# Patient Record
Sex: MALE | Race: WHITE
[De-identification: names, ages, dates, MRNs, and addresses within clinical notes are randomized per-mention and may not be internally consistent; named-entity substitution may affect disease eponyms.]

---

## 2017-06-13 NOTE — NUR
Patient discharged to home via AMBULANCE in stable conditon.  Written and 
verbal after care instructions given. Patient verbalizes understanding of 
instructions.  Report given to EMT.

## 2017-06-13 NOTE — NUR
CALLED [facility] AND SPOKE TO PAVEL BONILLA PT GOING BACK TO FACILITY. 
CALLED EMR FOR TX, ETA IS 30 MIN. PT IS RESTING IN BED, NO C/O PAIN.

## 2017-12-03 ENCOUNTER — HOSPITAL ENCOUNTER (INPATIENT)
Dept: HOSPITAL 12 - ER | Age: 82
LOS: 3 days | Discharge: NURSING, REHAB OR LONG-TERM CARE | DRG: 689 | End: 2017-12-06
Payer: MEDICARE

## 2017-12-03 VITALS — BODY MASS INDEX: 20.99 KG/M2 | WEIGHT: 126 LBS | HEIGHT: 65 IN

## 2017-12-03 DIAGNOSIS — D50.9: ICD-10-CM

## 2017-12-03 DIAGNOSIS — Z79.899: ICD-10-CM

## 2017-12-03 DIAGNOSIS — Z16.12: ICD-10-CM

## 2017-12-03 DIAGNOSIS — R53.1: ICD-10-CM

## 2017-12-03 DIAGNOSIS — K21.9: ICD-10-CM

## 2017-12-03 DIAGNOSIS — N39.0: Primary | ICD-10-CM

## 2017-12-03 DIAGNOSIS — F03.90: ICD-10-CM

## 2017-12-03 DIAGNOSIS — M19.90: ICD-10-CM

## 2017-12-03 DIAGNOSIS — K92.2: ICD-10-CM

## 2017-12-03 DIAGNOSIS — K59.09: ICD-10-CM

## 2017-12-03 DIAGNOSIS — I10: ICD-10-CM

## 2017-12-03 DIAGNOSIS — F41.9: ICD-10-CM

## 2017-12-03 DIAGNOSIS — G92: ICD-10-CM

## 2017-12-03 DIAGNOSIS — F32.9: ICD-10-CM

## 2017-12-03 LAB
ALP SERPL-CCNC: 138 U/L (ref 50–136)
ALT SERPL W/O P-5'-P-CCNC: 18 U/L (ref 16–63)
APPEARANCE UR: (no result)
AST SERPL-CCNC: 20 U/L (ref 15–37)
BASOPHILS # BLD AUTO: 0 K/UL (ref 0–8)
BASOPHILS NFR BLD AUTO: 0.4 % (ref 0–2)
BILIRUB DIRECT SERPL-MCNC: 0.1 MG/DL (ref 0–0.2)
BILIRUB SERPL-MCNC: 0.2 MG/DL (ref 0.2–1)
BILIRUB UR QL STRIP: NEGATIVE
BUN SERPL-MCNC: 19 MG/DL (ref 7–18)
CHLORIDE SERPL-SCNC: 100 MMOL/L (ref 98–107)
CO2 SERPL-SCNC: 29 MMOL/L (ref 21–32)
COLOR UR: YELLOW
CREAT SERPL-MCNC: 1.2 MG/DL (ref 0.6–1.3)
EOSINOPHIL # BLD AUTO: 0.2 K/UL (ref 0–0.7)
EOSINOPHIL NFR BLD AUTO: 2.1 % (ref 0–7)
GLUCOSE SERPL-MCNC: 99 MG/DL (ref 74–106)
GLUCOSE UR STRIP-MCNC: NEGATIVE MG/DL
HCT VFR BLD AUTO: 32.9 % (ref 40–50)
HGB BLD-MCNC: 10.6 G/DL (ref 14–18)
HGB UR QL STRIP: (no result)
KETONES UR STRIP-MCNC: NEGATIVE MG/DL
LEUKOCYTE ESTERASE UR QL STRIP: (no result)
LYMPHOCYTES # BLD AUTO: 2.8 K/UL (ref 0.8–4.8)
LYMPHOCYTES NFR BLD AUTO: 24.6 % (ref 20.5–51.5)
MCH RBC QN AUTO: 25.1 UUG (ref 27–31)
MCHC RBC AUTO-ENTMCNC: 32 G/DL (ref 32–37)
MCV RBC AUTO: 78.2 FL (ref 82–92)
MONOCYTES # BLD AUTO: 1.3 K/UL (ref 0.1–1.3)
MONOCYTES NFR BLD AUTO: 11.5 % (ref 0–11)
NEUTROPHILS # BLD AUTO: 7.1 K/UL (ref 1.8–8.9)
NEUTROPHILS NFR BLD AUTO: 61.4 % (ref 38.5–71.5)
NITRITE UR QL STRIP: POSITIVE
PH UR STRIP: 7 [PH] (ref 5–8)
PLATELET # BLD AUTO: 420 K/UL (ref 150–450)
POTASSIUM SERPL-SCNC: 3.9 MMOL/L (ref 3.5–5.1)
PROT UR QL STRIP: NEGATIVE
RBC # BLD AUTO: 4.2 MIL/UL (ref 4.7–6.1)
RBC #/AREA URNS HPF: (no result) /HPF (ref 0–3)
SP GR UR STRIP: 1.01 (ref 1–1.03)
UROBILINOGEN UR STRIP-MCNC: 0.2 E.U./DL
WBC # BLD AUTO: 11.4 K/UL (ref 4–11.2)
WBC #/AREA URNS HPF: (no result) /HPF
WBC #/AREA URNS HPF: (no result) /HPF (ref 0–3)
WS STN SPEC: 6.9 G/DL (ref 6.4–8.2)

## 2017-12-03 PROCEDURE — A4663 DIALYSIS BLOOD PRESSURE CUFF: HCPCS

## 2017-12-03 NOTE — NUR
Patient provided urine specimen, patient was soiled at that time.  During 
cleaning observed patient had rash throughout perineum (front and back) which 
was covered in cream.  Patient additionally had tissue stuck in the daiper in 
the no-anal area.  Patient cleaned and dressed in hoispital gown at this 
time.

## 2017-12-03 NOTE — NUR
Patient BIB private ambulance from [facility] for abnormal labs.  Per 
report, patient has ESBL in urine, facility paperwork indicates as well.  
Patient states no pain or complaint at this time except he has "anal pain."  To 
room 5A.

## 2017-12-03 NOTE — NUR
Pt arrived alert awake oriented to self and . in telemetry sinus rhythm. Pt noted BP 
172/69. No s/s of acute distress. Pt noted with redness to sacral and groin area. Able to 
follow simple commands. Awaiting MD orders. Continuing or Merrem IV antibiotics from ER. 
Denies pain or discomfort at this time. 2 side rails raised and call light placed within 
reach.

## 2017-12-04 VITALS — SYSTOLIC BLOOD PRESSURE: 145 MMHG | DIASTOLIC BLOOD PRESSURE: 79 MMHG

## 2017-12-04 VITALS — SYSTOLIC BLOOD PRESSURE: 172 MMHG | DIASTOLIC BLOOD PRESSURE: 96 MMHG

## 2017-12-04 VITALS — DIASTOLIC BLOOD PRESSURE: 73 MMHG | SYSTOLIC BLOOD PRESSURE: 117 MMHG

## 2017-12-04 VITALS — DIASTOLIC BLOOD PRESSURE: 85 MMHG | SYSTOLIC BLOOD PRESSURE: 146 MMHG

## 2017-12-04 LAB
BASOPHILS # BLD AUTO: 0 K/UL (ref 0–8)
BASOPHILS NFR BLD AUTO: 0.4 % (ref 0–2)
BUN SERPL-MCNC: 15 MG/DL (ref 7–18)
CHLORIDE SERPL-SCNC: 102 MMOL/L (ref 98–107)
CO2 SERPL-SCNC: 27 MMOL/L (ref 21–32)
CREAT SERPL-MCNC: 0.9 MG/DL (ref 0.6–1.3)
EOSINOPHIL # BLD AUTO: 0.2 K/UL (ref 0–0.7)
EOSINOPHIL NFR BLD AUTO: 2.4 % (ref 0–7)
GLUCOSE SERPL-MCNC: 82 MG/DL (ref 74–106)
HCT VFR BLD AUTO: 31.6 % (ref 36.7–47.1)
HGB BLD-MCNC: 10.1 G/DL (ref 12.5–16.3)
LYMPHOCYTES # BLD AUTO: 3.2 K/UL (ref 20–40)
LYMPHOCYTES NFR BLD AUTO: 30.3 % (ref 20.5–51.5)
MAGNESIUM SERPL-MCNC: 2 MG/DL (ref 1.8–2.4)
MCH RBC QN AUTO: 24.7 UUG (ref 23.8–33.4)
MCHC RBC AUTO-ENTMCNC: 32 G/DL (ref 32.5–36.3)
MCV RBC AUTO: 77.6 FL (ref 73–96.2)
MONOCYTES # BLD AUTO: 1.4 K/UL (ref 2–10)
MONOCYTES NFR BLD AUTO: 13.1 % (ref 0–11)
NEUTROPHILS # BLD AUTO: 5.6 K/UL (ref 1.8–8.9)
NEUTROPHILS NFR BLD AUTO: 53.8 % (ref 38.5–71.5)
PHOSPHATE SERPL-MCNC: 3.7 MG/DL (ref 2.5–4.9)
PLATELET # BLD AUTO: 349 K/UL (ref 152–348)
POTASSIUM SERPL-SCNC: 3.7 MMOL/L (ref 3.5–5.1)
RBC # BLD AUTO: 4.08 MIL/UL (ref 4.06–5.63)
WBC # BLD AUTO: 10.5 K/UL (ref 3.6–10.2)

## 2017-12-04 RX ADMIN — MEMANTINE HYDROCHLORIDE SCH MG: 10 TABLET ORAL at 21:41

## 2017-12-04 RX ADMIN — Medication SCH MG: at 08:10

## 2017-12-04 RX ADMIN — CARVEDILOL SCH MG: 6.25 TABLET, FILM COATED ORAL at 17:05

## 2017-12-04 RX ADMIN — DONEPEZIL HYDROCHLORIDE SCH MG: 10 TABLET, FILM COATED ORAL at 21:41

## 2017-12-04 RX ADMIN — SODIUM CHLORIDE PRN MLS/HR: 0.9 INJECTION, SOLUTION INTRAVENOUS at 03:50

## 2017-12-04 RX ADMIN — SODIUM CHLORIDE SCH MLS/HR: 9 INJECTION, SOLUTION INTRAVENOUS at 21:42

## 2017-12-04 RX ADMIN — Medication SCH ML: at 17:25

## 2017-12-04 RX ADMIN — FERROUS SULFATE TAB 325 MG (65 MG ELEMENTAL FE) SCH MG: 325 (65 FE) TAB at 17:03

## 2017-12-04 RX ADMIN — SODIUM CHLORIDE SCH MLS/HR: 9 INJECTION, SOLUTION INTRAVENOUS at 14:37

## 2017-12-04 RX ADMIN — Medication SCH ML: at 08:00

## 2017-12-04 RX ADMIN — FAMOTIDINE SCH MG: 20 TABLET, FILM COATED ORAL at 08:10

## 2017-12-04 RX ADMIN — CLOTRIMAZOLE AND BETAMETHASONE DIPROPIONATE SCH APPLIC: 10; .5 CREAM TOPICAL at 21:42

## 2017-12-04 RX ADMIN — CLOTRIMAZOLE AND BETAMETHASONE DIPROPIONATE SCH APPLIC: 10; .5 CREAM TOPICAL at 14:38

## 2017-12-04 RX ADMIN — CARVEDILOL SCH MG: 6.25 TABLET, FILM COATED ORAL at 08:11

## 2017-12-04 NOTE — NUR
PT CONFUSED, AOX1, AMBULATORY, ON FALL PRECAUTIONS, BED AT LOWEST LOCKED POSITION, BED ALARM 
SET. PT HAS HAD SEVERAL BM ON THE FLOOR, ONE OF THE BM HAD BLOOD IN STOOL, DOCTOR NOTIFIED 
AND STOOL FOR OB SENT, RESULTS PENDING. PT IS REORIENTED CONSTANTLY. WOUND CARE NURSE CAME 
AND EVALUATED THE PT, WOUND CARE DONE AS ORDERED.

## 2017-12-04 NOTE — NUR
WOUND CARE CONSULT: PT AMBULATES TO BATHROOM PER NURSING STAFF BUT INCONTINENT AT TIMES. 
VERY RED RASH NOTED TO PERINEUM, GROIN AREAS AND BUTTOCKS, PRESENT ON ADMISSION. 
RECOMMENDATIONS MADE FOR CARE OF RASH AND SKIN PROTECTION. DISCUSSED WITH NURSING STAFF. 
WILL SEE PRN. MD IN AGREEMENT WITH PLAN OF CARE. 

-------------------------------------------------------------------------------

Addendum: 12/04/17 at 1342 by PRASHANT GRANT RN

-------------------------------------------------------------------------------

Amended: Links added.

## 2017-12-05 VITALS — SYSTOLIC BLOOD PRESSURE: 115 MMHG | DIASTOLIC BLOOD PRESSURE: 70 MMHG

## 2017-12-05 VITALS — SYSTOLIC BLOOD PRESSURE: 131 MMHG | DIASTOLIC BLOOD PRESSURE: 77 MMHG

## 2017-12-05 VITALS — SYSTOLIC BLOOD PRESSURE: 154 MMHG | DIASTOLIC BLOOD PRESSURE: 91 MMHG

## 2017-12-05 LAB — HEMOCCULT STL QL: POSITIVE

## 2017-12-05 RX ADMIN — SODIUM CHLORIDE SCH MLS/HR: 9 INJECTION, SOLUTION INTRAVENOUS at 21:00

## 2017-12-05 RX ADMIN — CLOTRIMAZOLE AND BETAMETHASONE DIPROPIONATE SCH APPLIC: 10; .5 CREAM TOPICAL at 20:26

## 2017-12-05 RX ADMIN — SODIUM CHLORIDE PRN MLS/HR: 0.9 INJECTION, SOLUTION INTRAVENOUS at 02:05

## 2017-12-05 RX ADMIN — SODIUM CHLORIDE SCH MLS/HR: 9 INJECTION, SOLUTION INTRAVENOUS at 05:26

## 2017-12-05 RX ADMIN — MEMANTINE HYDROCHLORIDE SCH MG: 10 TABLET ORAL at 20:25

## 2017-12-05 RX ADMIN — FERROUS SULFATE TAB 325 MG (65 MG ELEMENTAL FE) SCH MG: 325 (65 FE) TAB at 16:03

## 2017-12-05 RX ADMIN — CLOTRIMAZOLE AND BETAMETHASONE DIPROPIONATE SCH APPLIC: 10; .5 CREAM TOPICAL at 08:42

## 2017-12-05 RX ADMIN — Medication SCH ML: at 08:43

## 2017-12-05 RX ADMIN — SODIUM CHLORIDE SCH MLS/HR: 9 INJECTION, SOLUTION INTRAVENOUS at 13:14

## 2017-12-05 RX ADMIN — Medication SCH ML: at 16:03

## 2017-12-05 RX ADMIN — DONEPEZIL HYDROCHLORIDE SCH MG: 10 TABLET, FILM COATED ORAL at 20:25

## 2017-12-05 RX ADMIN — Medication SCH MG: at 08:37

## 2017-12-05 RX ADMIN — FERROUS SULFATE TAB 325 MG (65 MG ELEMENTAL FE) SCH MG: 325 (65 FE) TAB at 08:37

## 2017-12-05 RX ADMIN — CARVEDILOL SCH MG: 6.25 TABLET, FILM COATED ORAL at 08:42

## 2017-12-05 RX ADMIN — FAMOTIDINE SCH MG: 20 TABLET, FILM COATED ORAL at 08:37

## 2017-12-05 RX ADMIN — CARVEDILOL SCH MG: 6.25 TABLET, FILM COATED ORAL at 16:03

## 2017-12-05 NOTE — NUR
Pt in room asleep in no acute distress. No reaction to current Merrem IV abx. No active 
loose BM at this time. Continue to monitor. Bed alarm on.

## 2017-12-05 NOTE — NUR
Applied lotrisone cream as ordered by wound care nurse.  Groin and buttocks were both bright 
red.   Instructed  CNA to keep skin dry at all times.  Established hourly rounding on pt.  
PT is in no acute distress.  PT forgetful at times.  Call light is within reach.

## 2017-12-05 NOTE — NUR
Fall precaution and isolation precaution for esbl urine implemented.  Bed alarm on pt near 
nursing station.   IV on left wrist #22 intact.  Call light is within reach.

## 2017-12-05 NOTE — NUR
Pt is in no acute distress.  PT cooperative with treatments and taking his medications.  PT 
denies any c/o pain.  Call light is within reach.

## 2017-12-05 NOTE — NUR
Pt in alert awake in room needing constant reorientation. No s/s of acute distress. Bed 
alarm on with 3 side rails up. Denies any pain or discomfort at this time.

## 2017-12-06 VITALS — DIASTOLIC BLOOD PRESSURE: 68 MMHG | SYSTOLIC BLOOD PRESSURE: 128 MMHG

## 2017-12-06 VITALS — SYSTOLIC BLOOD PRESSURE: 171 MMHG | DIASTOLIC BLOOD PRESSURE: 95 MMHG

## 2017-12-06 VITALS — DIASTOLIC BLOOD PRESSURE: 75 MMHG | SYSTOLIC BLOOD PRESSURE: 119 MMHG

## 2017-12-06 VITALS — SYSTOLIC BLOOD PRESSURE: 146 MMHG | DIASTOLIC BLOOD PRESSURE: 80 MMHG

## 2017-12-06 LAB
BASOPHILS # BLD AUTO: 0 K/UL (ref 0–8)
BASOPHILS NFR BLD AUTO: 0.2 % (ref 0–2)
EOSINOPHIL # BLD AUTO: 0.2 K/UL (ref 0–0.7)
EOSINOPHIL NFR BLD AUTO: 1.5 % (ref 0–7)
HCT VFR BLD AUTO: 31.1 % (ref 40–50)
HGB BLD-MCNC: 9.9 G/DL (ref 14–18)
LYMPHOCYTES # BLD AUTO: 2.4 K/UL (ref 0.8–4.8)
LYMPHOCYTES NFR BLD AUTO: 20.2 % (ref 20.5–51.5)
MCH RBC QN AUTO: 25.1 UUG (ref 27–31)
MCHC RBC AUTO-ENTMCNC: 32 G/DL (ref 32–37)
MCV RBC AUTO: 78.9 FL (ref 82–92)
MONOCYTES # BLD AUTO: 1.2 K/UL (ref 0.1–1.3)
MONOCYTES NFR BLD AUTO: 10.1 % (ref 0–11)
NEUTROPHILS # BLD AUTO: 8.1 K/UL (ref 1.8–8.9)
NEUTROPHILS NFR BLD AUTO: 68 % (ref 38.5–71.5)
PLATELET # BLD AUTO: 413 K/UL (ref 150–450)
RBC # BLD AUTO: 3.95 MIL/UL (ref 4.7–6.1)
WBC # BLD AUTO: 11.9 K/UL (ref 4–11.2)

## 2017-12-06 RX ADMIN — SODIUM CHLORIDE SCH MLS/HR: 9 INJECTION, SOLUTION INTRAVENOUS at 05:08

## 2017-12-06 RX ADMIN — CARVEDILOL SCH MG: 6.25 TABLET, FILM COATED ORAL at 09:04

## 2017-12-06 RX ADMIN — FERROUS SULFATE TAB 325 MG (65 MG ELEMENTAL FE) SCH MG: 325 (65 FE) TAB at 17:32

## 2017-12-06 RX ADMIN — CARVEDILOL SCH MG: 6.25 TABLET, FILM COATED ORAL at 17:37

## 2017-12-06 RX ADMIN — SODIUM CHLORIDE SCH MLS/HR: 9 INJECTION, SOLUTION INTRAVENOUS at 14:09

## 2017-12-06 RX ADMIN — Medication SCH ML: at 09:03

## 2017-12-06 RX ADMIN — CLOTRIMAZOLE AND BETAMETHASONE DIPROPIONATE SCH APPLIC: 10; .5 CREAM TOPICAL at 09:04

## 2017-12-06 RX ADMIN — FAMOTIDINE SCH MG: 20 TABLET, FILM COATED ORAL at 09:02

## 2017-12-06 RX ADMIN — FERROUS SULFATE TAB 325 MG (65 MG ELEMENTAL FE) SCH MG: 325 (65 FE) TAB at 09:02

## 2017-12-06 RX ADMIN — SODIUM CHLORIDE PRN MLS/HR: 0.9 INJECTION, SOLUTION INTRAVENOUS at 00:46

## 2017-12-06 RX ADMIN — Medication SCH ML: at 17:35

## 2017-12-06 RX ADMIN — Medication SCH MG: at 09:02

## 2017-12-06 NOTE — NUR
Patient been discharge home in safe and stable conditions. No s/s of distress noted. No c/o 
of pain. Patient was was cooperative with the treatment and interventions. Discharge 
instructions were signed. IV was kept per facility request to continue antibiotics. Patient 
was picked up by ambulance in stable conditions. Report was given to [facility]. All his need were met by the staff.

## 2017-12-06 NOTE — NUR
Pt in room in no acute distress. No s/s of increased weakness to upper or lower extremities. 
Continue to monitor. Currently on Merrem IV abx. Bed alarm on.

## 2017-12-17 ENCOUNTER — HOSPITAL ENCOUNTER (INPATIENT)
Dept: HOSPITAL 12 - ER | Age: 82
LOS: 5 days | Discharge: NURSING, REHAB OR LONG-TERM CARE | DRG: 393 | End: 2017-12-22
Payer: MEDICARE

## 2017-12-17 VITALS — WEIGHT: 120 LBS | HEIGHT: 66 IN | BODY MASS INDEX: 19.29 KG/M2

## 2017-12-17 VITALS — DIASTOLIC BLOOD PRESSURE: 95 MMHG | SYSTOLIC BLOOD PRESSURE: 153 MMHG

## 2017-12-17 VITALS — SYSTOLIC BLOOD PRESSURE: 147 MMHG | DIASTOLIC BLOOD PRESSURE: 91 MMHG

## 2017-12-17 DIAGNOSIS — N28.1: ICD-10-CM

## 2017-12-17 DIAGNOSIS — K63.5: ICD-10-CM

## 2017-12-17 DIAGNOSIS — D50.9: ICD-10-CM

## 2017-12-17 DIAGNOSIS — N39.0: ICD-10-CM

## 2017-12-17 DIAGNOSIS — R73.9: ICD-10-CM

## 2017-12-17 DIAGNOSIS — F41.9: ICD-10-CM

## 2017-12-17 DIAGNOSIS — F03.90: ICD-10-CM

## 2017-12-17 DIAGNOSIS — D50.0: ICD-10-CM

## 2017-12-17 DIAGNOSIS — A04.72: ICD-10-CM

## 2017-12-17 DIAGNOSIS — E83.42: ICD-10-CM

## 2017-12-17 DIAGNOSIS — K76.89: ICD-10-CM

## 2017-12-17 DIAGNOSIS — E87.6: ICD-10-CM

## 2017-12-17 DIAGNOSIS — I10: ICD-10-CM

## 2017-12-17 DIAGNOSIS — K62.5: ICD-10-CM

## 2017-12-17 DIAGNOSIS — Z79.899: ICD-10-CM

## 2017-12-17 DIAGNOSIS — K59.09: ICD-10-CM

## 2017-12-17 DIAGNOSIS — E83.51: ICD-10-CM

## 2017-12-17 DIAGNOSIS — D47.3: ICD-10-CM

## 2017-12-17 DIAGNOSIS — K64.9: ICD-10-CM

## 2017-12-17 DIAGNOSIS — Z87.440: ICD-10-CM

## 2017-12-17 DIAGNOSIS — E43: ICD-10-CM

## 2017-12-17 DIAGNOSIS — K21.9: ICD-10-CM

## 2017-12-17 DIAGNOSIS — F32.9: ICD-10-CM

## 2017-12-17 DIAGNOSIS — G93.41: ICD-10-CM

## 2017-12-17 DIAGNOSIS — K55.9: Primary | ICD-10-CM

## 2017-12-17 DIAGNOSIS — E88.09: ICD-10-CM

## 2017-12-17 LAB
ALP SERPL-CCNC: 149 U/L (ref 50–136)
ALT SERPL W/O P-5'-P-CCNC: 14 U/L (ref 16–63)
AST SERPL-CCNC: 16 U/L (ref 15–37)
BASOPHILS # BLD AUTO: 0 K/UL (ref 0–8)
BASOPHILS NFR BLD AUTO: 0.3 % (ref 0–2)
BILIRUB DIRECT SERPL-MCNC: 0.1 MG/DL (ref 0–0.2)
BILIRUB SERPL-MCNC: 0.4 MG/DL (ref 0.2–1)
BUN SERPL-MCNC: 20 MG/DL (ref 7–18)
CHLORIDE SERPL-SCNC: 100 MMOL/L (ref 98–107)
CO2 SERPL-SCNC: 29 MMOL/L (ref 21–32)
CREAT SERPL-MCNC: 1.1 MG/DL (ref 0.6–1.3)
EOSINOPHIL # BLD AUTO: 0.3 K/UL (ref 0–0.7)
EOSINOPHIL NFR BLD AUTO: 2.1 % (ref 0–7)
EOSINOPHIL NFR BLD MANUAL: 4 % (ref 0–8)
GLUCOSE SERPL-MCNC: 91 MG/DL (ref 74–106)
HCT VFR BLD AUTO: 35.6 % (ref 36.7–47.1)
HEMOCCULT STL QL: POSITIVE
HGB BLD-MCNC: 11.4 G/DL (ref 12.5–16.3)
LIPASE SERPL-CCNC: 99 U/L (ref 73–393)
LYMPHOCYTES # BLD AUTO: 2.9 K/UL (ref 20–40)
LYMPHOCYTES NFR BLD AUTO: 23.1 % (ref 20.5–51.5)
LYMPHOCYTES NFR BLD MANUAL: 25 % (ref 20–40)
MCH RBC QN AUTO: 25.6 UUG (ref 23.8–33.4)
MCHC RBC AUTO-ENTMCNC: 32 G/DL (ref 32.5–36.3)
MCV RBC AUTO: 80.3 FL (ref 73–96.2)
MONOCYTES # BLD AUTO: 1.9 K/UL (ref 2–10)
MONOCYTES NFR BLD AUTO: 15.3 % (ref 0–11)
MONOCYTES NFR BLD MANUAL: 12 % (ref 2–10)
NEUTROPHILS # BLD AUTO: 7.3 K/UL (ref 1.8–8.9)
NEUTROPHILS NFR BLD AUTO: 59.2 % (ref 38.5–71.5)
NEUTS BAND NFR BLD MANUAL: 8 % (ref 0–10)
NEUTS SEG NFR BLD MANUAL: 51 % (ref 42–75)
PLATELET # BLD AUTO: 375 K/UL (ref 152–348)
POTASSIUM SERPL-SCNC: 3.8 MMOL/L (ref 3.5–5.1)
RBC # BLD AUTO: 4.43 MIL/UL (ref 4.06–5.63)
WBC # BLD AUTO: 12.4 K/UL (ref 3.6–10.2)
WS STN SPEC: 6.9 G/DL (ref 6.4–8.2)

## 2017-12-17 PROCEDURE — A4217 STERILE WATER/SALINE, 500 ML: HCPCS

## 2017-12-17 PROCEDURE — A4663 DIALYSIS BLOOD PRESSURE CUFF: HCPCS

## 2017-12-17 PROCEDURE — C9113 INJ PANTOPRAZOLE SODIUM, VIA: HCPCS

## 2017-12-17 RX ADMIN — SODIUM CHLORIDE PRN MLS/HR: 0.9 INJECTION, SOLUTION INTRAVENOUS at 18:21

## 2017-12-17 RX ADMIN — DEXTROSE SCH MG: 50 INJECTION, SOLUTION INTRAVENOUS at 20:59

## 2017-12-17 NOTE — NUR
RECEIVED PATIENT ASLEEP IN BED. NO S/S OF PAIN OR DISCOMFORT. NO FACIAL GRIMACE NOTED. NO 
RESP. DISTRESS NOTED. IVF INFUSING WELL TO RIGHT FA. ON TELE SR WITH FREQUENT PVC'S. BED 
ALARM ON. CALL LIGHT IN REACH. ALL NEEDS ATTENDED. WILL CONTINUE TO MONITOR AND ASSESS.

## 2017-12-17 NOTE — NUR
PT ARRIVED ON THE UNIT CALM, COOPERATIVE, AOX1, CONFUSED, VITAL SIGNS STABLE. BROUGHT IN 
BELONGINGS, PT CAME IN FROM [facility]. PT SHOWS NO SIGNS OF DISTRESS. PICTURES OF 
SKIN WERE TAKEN AND PLACED INTO CHART. PT NPO AS PER DOCTORS ORDER.

## 2017-12-17 NOTE — NUR
PT IS OBSERVED RESTING, HAS NS RUNNING, GAUGE IS INTACT AND PATENT, PT IS AOX1 BUT CAN 
ANSWER SIMPLE QUESTIONS. PT IS NPO BECAUSE PT MAY HAVE A EGD OR COLONOSCOPY DONE. PT HAS NO 
SIGNS OF RESPIRATORY DISTRESS AT THIS TIME. VITAL SIGNS STABLE.

## 2017-12-18 VITALS — DIASTOLIC BLOOD PRESSURE: 82 MMHG | SYSTOLIC BLOOD PRESSURE: 132 MMHG

## 2017-12-18 VITALS — SYSTOLIC BLOOD PRESSURE: 134 MMHG | DIASTOLIC BLOOD PRESSURE: 92 MMHG

## 2017-12-18 VITALS — DIASTOLIC BLOOD PRESSURE: 92 MMHG | SYSTOLIC BLOOD PRESSURE: 126 MMHG

## 2017-12-18 VITALS — DIASTOLIC BLOOD PRESSURE: 74 MMHG | SYSTOLIC BLOOD PRESSURE: 121 MMHG

## 2017-12-18 VITALS — DIASTOLIC BLOOD PRESSURE: 93 MMHG | SYSTOLIC BLOOD PRESSURE: 147 MMHG

## 2017-12-18 LAB
ALP SERPL-CCNC: 132 U/L (ref 50–136)
ALT SERPL W/O P-5'-P-CCNC: 10 U/L (ref 16–63)
APPEARANCE UR: CLEAR
AST SERPL-CCNC: 17 U/L (ref 15–37)
BASOPHILS # BLD AUTO: 0.1 K/UL (ref 0–8)
BASOPHILS NFR BLD AUTO: 0.6 % (ref 0–2)
BILIRUB DIRECT SERPL-MCNC: 0.2 MG/DL (ref 0–0.2)
BILIRUB SERPL-MCNC: 0.4 MG/DL (ref 0.2–1)
BILIRUB UR QL STRIP: NEGATIVE
COLOR UR: YELLOW
DEPRECATED SQUAMOUS URNS QL MICRO: (no result) /HPF
EOSINOPHIL # BLD AUTO: 0.3 K/UL (ref 0–0.7)
EOSINOPHIL NFR BLD AUTO: 2.4 % (ref 0–7)
GLUCOSE UR STRIP-MCNC: NEGATIVE MG/DL
HCT VFR BLD AUTO: 33 % (ref 36.7–47.1)
HGB BLD-MCNC: 10.6 G/DL (ref 12.5–16.3)
HGB UR QL STRIP: (no result)
KETONES UR STRIP-MCNC: NEGATIVE MG/DL
LEUKOCYTE ESTERASE UR QL STRIP: (no result)
LYMPHOCYTES # BLD AUTO: 3 K/UL (ref 20–40)
LYMPHOCYTES NFR BLD AUTO: 25.4 % (ref 20.5–51.5)
MCH RBC QN AUTO: 25.6 UUG (ref 23.8–33.4)
MCHC RBC AUTO-ENTMCNC: 32 G/DL (ref 32.5–36.3)
MCV RBC AUTO: 79.9 FL (ref 73–96.2)
MONOCYTES # BLD AUTO: 1.6 K/UL (ref 2–10)
MONOCYTES NFR BLD AUTO: 13.1 % (ref 0–11)
NEUTROPHILS # BLD AUTO: 7 K/UL (ref 1.8–8.9)
NEUTROPHILS NFR BLD AUTO: 58.5 % (ref 38.5–71.5)
NITRITE UR QL STRIP: NEGATIVE
PH UR STRIP: 6 [PH] (ref 5–8)
PLATELET # BLD AUTO: 354 K/UL (ref 152–348)
PROT UR QL STRIP: (no result)
RBC # BLD AUTO: 4.12 MIL/UL (ref 4.06–5.63)
RBC #/AREA URNS HPF: (no result) /HPF (ref 0–3)
SP GR UR STRIP: 1.02 (ref 1–1.03)
UROBILINOGEN UR STRIP-MCNC: 0.2 E.U./DL
WBC # BLD AUTO: 12 K/UL (ref 3.6–10.2)
WBC #/AREA URNS HPF: (no result) /HPF
WBC #/AREA URNS HPF: (no result) /HPF (ref 0–3)
WS STN SPEC: 6 G/DL (ref 6.4–8.2)

## 2017-12-18 RX ADMIN — NYSTATIN SCH APPLIC: 100000 CREAM TOPICAL at 14:05

## 2017-12-18 RX ADMIN — DEXTROSE SCH MG: 50 INJECTION, SOLUTION INTRAVENOUS at 08:32

## 2017-12-18 RX ADMIN — SODIUM CHLORIDE PRN MLS/HR: 0.9 INJECTION, SOLUTION INTRAVENOUS at 05:02

## 2017-12-18 RX ADMIN — DEXTROSE SCH MG: 50 INJECTION, SOLUTION INTRAVENOUS at 20:02

## 2017-12-18 RX ADMIN — NYSTATIN SCH APPLIC: 100000 CREAM TOPICAL at 20:03

## 2017-12-18 RX ADMIN — SODIUM CHLORIDE PRN MLS/HR: 0.9 INJECTION, SOLUTION INTRAVENOUS at 18:47

## 2017-12-18 NOTE — NUR
IV HEPLOCK NOTED TO RIGHT FA, LEAKING AND INFILTRATED. REMOVED AND NEW IV H/L STARTED TO 
RIGHT WRIST #20 GAUGE. VSS. PATIENT SLEPT WELL THROUGHOUT THE NIGHT. DENIES PAIN OR 
DISCOMFORT. NO RESP. DISTRESS NOTED. ON TELE SR WITH FREQUENT PVC'S. BED ALARM ON. CALL 
LIGHT IN REACH. ALL NEEDS ATTENDED.

## 2017-12-18 NOTE — NUR
PATIENT AWAKE IN BED, DIAPER CHANGE AND SKIN CARE PROVIDED. PATIENT URINATED AND HAD LOOSE 
BM. SOME BLOOD NOTED IN DIAPER. WILL CONTINUE TO MONITOR. ALL NEEDS ATTENDED.

## 2017-12-18 NOTE — NUR
Noted bloody loose stool, patient got up , attempted to go the bathroom. Assisted back to 
bed. Incontinence care done. Noted excoriation of perianal area. With orders for Nystatin 
cream, applied

## 2017-12-19 VITALS — DIASTOLIC BLOOD PRESSURE: 74 MMHG | SYSTOLIC BLOOD PRESSURE: 122 MMHG

## 2017-12-19 VITALS — DIASTOLIC BLOOD PRESSURE: 67 MMHG | SYSTOLIC BLOOD PRESSURE: 113 MMHG

## 2017-12-19 VITALS — DIASTOLIC BLOOD PRESSURE: 73 MMHG | SYSTOLIC BLOOD PRESSURE: 116 MMHG

## 2017-12-19 VITALS — DIASTOLIC BLOOD PRESSURE: 85 MMHG | SYSTOLIC BLOOD PRESSURE: 146 MMHG

## 2017-12-19 LAB
ALP SERPL-CCNC: 125 U/L (ref 50–136)
ALT SERPL W/O P-5'-P-CCNC: 12 U/L (ref 16–63)
AST SERPL-CCNC: 24 U/L (ref 15–37)
BASOPHILS # BLD AUTO: 0 K/UL (ref 0–8)
BASOPHILS NFR BLD AUTO: 0.3 % (ref 0–2)
BILIRUB SERPL-MCNC: 0.4 MG/DL (ref 0.2–1)
BUN SERPL-MCNC: 13 MG/DL (ref 7–18)
CHLORIDE SERPL-SCNC: 104 MMOL/L (ref 98–107)
CO2 SERPL-SCNC: 22 MMOL/L (ref 21–32)
CREAT SERPL-MCNC: 1.2 MG/DL (ref 0.6–1.3)
EOSINOPHIL # BLD AUTO: 0.3 K/UL (ref 0–0.7)
EOSINOPHIL NFR BLD AUTO: 2.1 % (ref 0–7)
GLUCOSE SERPL-MCNC: 151 MG/DL (ref 74–106)
HCT VFR BLD AUTO: 33.1 % (ref 36.7–47.1)
HGB BLD-MCNC: 10.6 G/DL (ref 12.5–16.3)
IRON SERPL-MCNC: 52 UG/DL (ref 50–175)
LYMPHOCYTES # BLD AUTO: 2.6 K/UL (ref 20–40)
LYMPHOCYTES NFR BLD AUTO: 19.9 % (ref 20.5–51.5)
MAGNESIUM SERPL-MCNC: 1.8 MG/DL (ref 1.8–2.4)
MCH RBC QN AUTO: 26 UUG (ref 23.8–33.4)
MCHC RBC AUTO-ENTMCNC: 32 G/DL (ref 32.5–36.3)
MCV RBC AUTO: 81.6 FL (ref 73–96.2)
MONOCYTES # BLD AUTO: 1.2 K/UL (ref 2–10)
MONOCYTES NFR BLD AUTO: 9.5 % (ref 0–11)
NEUTROPHILS # BLD AUTO: 9 K/UL (ref 1.8–8.9)
NEUTROPHILS NFR BLD AUTO: 68.2 % (ref 38.5–71.5)
PHOSPHATE SERPL-MCNC: 2.9 MG/DL (ref 2.5–4.9)
PLATELET # BLD AUTO: 342 K/UL (ref 152–348)
POTASSIUM SERPL-SCNC: 3.5 MMOL/L (ref 3.5–5.1)
RBC # BLD AUTO: 4.05 MIL/UL (ref 4.06–5.63)
WBC # BLD AUTO: 13.2 K/UL (ref 3.6–10.2)
WS STN SPEC: 5.7 G/DL (ref 6.4–8.2)

## 2017-12-19 RX ADMIN — NYSTATIN SCH APPLIC: 100000 CREAM TOPICAL at 08:45

## 2017-12-19 RX ADMIN — NYSTATIN SCH APPLIC: 100000 CREAM TOPICAL at 20:26

## 2017-12-19 RX ADMIN — ANORECTAL OINTMENT PRN APPLIC: 15.7; .44; 24; 20.6 OINTMENT TOPICAL at 08:45

## 2017-12-19 RX ADMIN — DEXTROSE SCH MLS/HR: 50 INJECTION, SOLUTION INTRAVENOUS at 11:46

## 2017-12-19 RX ADMIN — MEMANTINE HYDROCHLORIDE SCH MG: 10 TABLET ORAL at 20:25

## 2017-12-19 RX ADMIN — SODIUM CHLORIDE PRN MLS/HR: 0.9 INJECTION, SOLUTION INTRAVENOUS at 20:24

## 2017-12-19 RX ADMIN — DEXTROSE SCH MG: 50 INJECTION, SOLUTION INTRAVENOUS at 08:44

## 2017-12-19 RX ADMIN — SODIUM CHLORIDE PRN MLS/HR: 0.9 INJECTION, SOLUTION INTRAVENOUS at 09:00

## 2017-12-19 RX ADMIN — DEXTROSE SCH MG: 50 INJECTION, SOLUTION INTRAVENOUS at 20:26

## 2017-12-19 RX ADMIN — ACETAMINOPHEN PRN MG: 325 TABLET ORAL at 08:44

## 2017-12-19 RX ADMIN — DONEPEZIL HYDROCHLORIDE SCH MG: 10 TABLET, FILM COATED ORAL at 20:25

## 2017-12-19 NOTE — NUR
nsg: no acute distress noted. denies discomfort. started on clear liq last night.  had 2 
episodes of bowel movement, loose, mucoid, with tinged of red blood.  cont to monitor.

## 2017-12-19 NOTE — NUR
HEMODYNAMIC STATUS STABLE PAIN UNDER CONTROL SAFETY MEASURE PROVIDED CALL LIGHT IN REACH AND 
BED ALARM ON

## 2017-12-19 NOTE — NUR
AWAKE CONFUSED FORGETFUL BUT ABLE TO FOLLOW SOME SIMPLE DIRECTION NO SOB OR PAIN ON FALL 
;/ASPIRATION PRECAUTION BED ALARM ON AND CALL LIGHT IN REACH

## 2017-12-20 VITALS — DIASTOLIC BLOOD PRESSURE: 84 MMHG | SYSTOLIC BLOOD PRESSURE: 149 MMHG

## 2017-12-20 VITALS — SYSTOLIC BLOOD PRESSURE: 131 MMHG | DIASTOLIC BLOOD PRESSURE: 98 MMHG

## 2017-12-20 VITALS — SYSTOLIC BLOOD PRESSURE: 110 MMHG | DIASTOLIC BLOOD PRESSURE: 70 MMHG

## 2017-12-20 VITALS — SYSTOLIC BLOOD PRESSURE: 189 MMHG | DIASTOLIC BLOOD PRESSURE: 89 MMHG

## 2017-12-20 VITALS — SYSTOLIC BLOOD PRESSURE: 121 MMHG | DIASTOLIC BLOOD PRESSURE: 97 MMHG

## 2017-12-20 LAB
ALP SERPL-CCNC: 117 U/L (ref 50–136)
ALT SERPL W/O P-5'-P-CCNC: 10 U/L (ref 16–63)
AST SERPL-CCNC: 19 U/L (ref 15–37)
BASOPHILS # BLD AUTO: 0 K/UL (ref 0–8)
BASOPHILS NFR BLD AUTO: 0.2 % (ref 0–2)
BILIRUB SERPL-MCNC: 0.3 MG/DL (ref 0.2–1)
BUN SERPL-MCNC: 8 MG/DL (ref 7–18)
CHLORIDE SERPL-SCNC: 105 MMOL/L (ref 98–107)
CHOLEST SERPL-MCNC: 105 MG/DL (ref ?–200)
CO2 SERPL-SCNC: 27 MMOL/L (ref 21–32)
CREAT SERPL-MCNC: 1 MG/DL (ref 0.6–1.3)
EOSINOPHIL # BLD AUTO: 0.5 K/UL (ref 0–0.7)
EOSINOPHIL NFR BLD AUTO: 4.3 % (ref 0–7)
GLUCOSE SERPL-MCNC: 86 MG/DL (ref 74–106)
HCT VFR BLD AUTO: 32.7 % (ref 36.7–47.1)
HDLC SERPL-MCNC: 34 MG/DL (ref 40–60)
HEMOCCULT STL QL: POSITIVE
HGB BLD-MCNC: 10.6 G/DL (ref 12.5–16.3)
LYMPHOCYTES # BLD AUTO: 2.4 K/UL (ref 20–40)
LYMPHOCYTES NFR BLD AUTO: 20.9 % (ref 20.5–51.5)
MAGNESIUM SERPL-MCNC: 1.6 MG/DL (ref 1.8–2.4)
MCH RBC QN AUTO: 26 UUG (ref 23.8–33.4)
MCHC RBC AUTO-ENTMCNC: 33 G/DL (ref 32.5–36.3)
MCV RBC AUTO: 79.9 FL (ref 73–96.2)
MONOCYTES # BLD AUTO: 1.2 K/UL (ref 2–10)
MONOCYTES NFR BLD AUTO: 10.4 % (ref 0–11)
NEUTROPHILS # BLD AUTO: 7.4 K/UL (ref 1.8–8.9)
NEUTROPHILS NFR BLD AUTO: 64.2 % (ref 38.5–71.5)
PHOSPHATE SERPL-MCNC: 2.9 MG/DL (ref 2.5–4.9)
PLATELET # BLD AUTO: 341 K/UL (ref 152–348)
POTASSIUM SERPL-SCNC: 3.4 MMOL/L (ref 3.5–5.1)
RBC # BLD AUTO: 4.09 MIL/UL (ref 4.06–5.63)
TRIGL SERPL-MCNC: 70 MG/DL (ref 30–150)
TSH SERPL DL<=0.005 MIU/L-ACNC: 1.02 MIU/ML (ref 0.36–3.74)
WBC # BLD AUTO: 11.6 K/UL (ref 3.6–10.2)
WS STN SPEC: 5.6 G/DL (ref 6.4–8.2)

## 2017-12-20 RX ADMIN — DONEPEZIL HYDROCHLORIDE SCH MG: 10 TABLET, FILM COATED ORAL at 20:22

## 2017-12-20 RX ADMIN — Medication SCH MG: at 08:02

## 2017-12-20 RX ADMIN — NYSTATIN SCH APPLIC: 100000 CREAM TOPICAL at 20:23

## 2017-12-20 RX ADMIN — ANORECTAL OINTMENT PRN APPLIC: 15.7; .44; 24; 20.6 OINTMENT TOPICAL at 08:11

## 2017-12-20 RX ADMIN — NYSTATIN SCH APPLIC: 100000 CREAM TOPICAL at 08:11

## 2017-12-20 RX ADMIN — DOCUSATE SODIUM SCH MG: 100 CAPSULE, LIQUID FILLED ORAL at 08:12

## 2017-12-20 RX ADMIN — DEXTROSE SCH MG: 50 INJECTION, SOLUTION INTRAVENOUS at 08:02

## 2017-12-20 RX ADMIN — SODIUM CHLORIDE PRN MLS/HR: 0.9 INJECTION, SOLUTION INTRAVENOUS at 20:25

## 2017-12-20 RX ADMIN — ACETAMINOPHEN PRN MG: 325 TABLET ORAL at 21:26

## 2017-12-20 RX ADMIN — MEMANTINE HYDROCHLORIDE SCH MG: 10 TABLET ORAL at 20:22

## 2017-12-20 RX ADMIN — DEXTROSE SCH MLS/HR: 50 INJECTION, SOLUTION INTRAVENOUS at 12:06

## 2017-12-20 RX ADMIN — DEXTROSE SCH MG: 50 INJECTION, SOLUTION INTRAVENOUS at 20:53

## 2017-12-20 RX ADMIN — SODIUM CHLORIDE PRN MLS/HR: 0.9 INJECTION, SOLUTION INTRAVENOUS at 08:04

## 2017-12-20 NOTE — NUR
IV heplock to right wrist noted to be leaking at the site.  Restarted a new PIV on the left 
forearm 20gauge. Patent and intact, flushing well. No pain or sweling at the site. Old IV to 
right wrist removed.

## 2017-12-20 NOTE — NUR
PATIENT AWAKE IN BED. SLEPT WELL. DENIES PAIN. IVF INFUSING WELL. VSS. BED ALARM ON. CALL 
LIGHT IN REACH. ALL NEEDS ATTENDED. WILL CONTINUE TO MONITOR.

## 2017-12-20 NOTE — NUR
GILBERTO HERRERA WAS INFORM OF PATIENT  C/O HUNGRY  AND  DR DIEGO DOES NOT COME TO SEE 
PATIENT YET NEW ORDER DIET ADV  RECIEVED

## 2017-12-20 NOTE — NUR
Patient awake and alert, laying in bed high-douglas's.  Watching TV and drinking golytely as 
ordered for colonoscopy tomorrow.  Tolerating well, VSS. Bed in low position with call light 
within reach.

## 2017-12-20 NOTE — NUR
RECEIVED PATIENT AWAKE IN BED. PATIENT IS A/O X2, BUT VERY FORGETFUL AND NEEDS FREQUENT 
REDIRECTION. VERY PLEASANT WHEN APPROACHED AND VERY COOPERATIVE WITH CARE. PATENT IS 
CURRENTLY DRINKING GOLYTELY AS ORDERED FOR EGD/COLONOSCOPY IN AM. TOLERATING WELL. VSS. IVF 
INFUSING WELL TO LEFT FA. NO RESP. DISTRESS NOTED. DENIES ANY SOB OR PAIN/DISCOMFORT. CALL 
LIGHT IN REACH. BED ALARM ON. ALL NEEDS ATTENDED. WILL CONTINUE TO MONITOR.

## 2017-12-20 NOTE — NUR
UNABLE TO APPLY NYSTATIN CREAM TO AFFECTED AREA AT THIS TIME. PATIENT IS HAVING FREQUENT 
LOOSE STOOLS FOR COLONOSCOPY PREP.

## 2017-12-20 NOTE — NUR
AWAKE CONFUSED ORTX2 COOPERATE WELL NO ACUTE DISTRESS  OR PAIN ON FALL PRECAUTION BED ALARM 
ON AND CALL LIGHT IN REACH

## 2017-12-20 NOTE — NUR
PATIENT AWAKE IN BED. STILL DRINKING GOLYTELY AND TOLERATING WELL. MORE THAN HALF OF THE 
BOTTLE IS COMPLETE. PATIENT IS HAVING LOOSE MUCOUS STOOLS NOTED IN DIAPER. SEVERE REDNESS 
AND EXCORIATION NOTED TO BUTTOCKS AND BILATERAL GROIN. SKIN CARE FREQUENTLY PROVIDED. WILL 
COTINUE TO MONITOR. CALLED OUT TO DR. DIEGO FOR FURTHER ORDERS. CALL LIGHT IN REACH. ALL 
NEEDS ATTENDED. WILL CONTINUE TO MONITOR AND ASSESS.

## 2017-12-21 VITALS — SYSTOLIC BLOOD PRESSURE: 154 MMHG | DIASTOLIC BLOOD PRESSURE: 83 MMHG

## 2017-12-21 VITALS — SYSTOLIC BLOOD PRESSURE: 148 MMHG | DIASTOLIC BLOOD PRESSURE: 100 MMHG

## 2017-12-21 VITALS — DIASTOLIC BLOOD PRESSURE: 86 MMHG | SYSTOLIC BLOOD PRESSURE: 142 MMHG

## 2017-12-21 VITALS — SYSTOLIC BLOOD PRESSURE: 142 MMHG | DIASTOLIC BLOOD PRESSURE: 79 MMHG

## 2017-12-21 LAB
ALP SERPL-CCNC: 132 U/L (ref 50–136)
ALT SERPL W/O P-5'-P-CCNC: 14 U/L (ref 16–63)
AST SERPL-CCNC: 22 U/L (ref 15–37)
BASOPHILS # BLD AUTO: 0 K/UL (ref 0–8)
BASOPHILS NFR BLD AUTO: 0.4 % (ref 0–2)
BILIRUB SERPL-MCNC: 0.2 MG/DL (ref 0.2–1)
BUN SERPL-MCNC: 5 MG/DL (ref 7–18)
CHLORIDE SERPL-SCNC: 105 MMOL/L (ref 98–107)
CO2 SERPL-SCNC: 27 MMOL/L (ref 21–32)
CREAT SERPL-MCNC: 0.9 MG/DL (ref 0.6–1.3)
EOSINOPHIL # BLD AUTO: 0.4 K/UL (ref 0–0.7)
EOSINOPHIL NFR BLD AUTO: 4.3 % (ref 0–7)
GLUCOSE SERPL-MCNC: 79 MG/DL (ref 74–106)
HCT VFR BLD AUTO: 34.3 % (ref 36.7–47.1)
HGB BLD-MCNC: 11.2 G/DL (ref 12.5–16.3)
LYMPHOCYTES # BLD AUTO: 2.8 K/UL (ref 20–40)
LYMPHOCYTES NFR BLD AUTO: 27.2 % (ref 20.5–51.5)
MAGNESIUM SERPL-MCNC: 2 MG/DL (ref 1.8–2.4)
MCH RBC QN AUTO: 26.2 UUG (ref 23.8–33.4)
MCHC RBC AUTO-ENTMCNC: 33 G/DL (ref 32.5–36.3)
MCV RBC AUTO: 80.1 FL (ref 73–96.2)
MONOCYTES # BLD AUTO: 1.1 K/UL (ref 2–10)
MONOCYTES NFR BLD AUTO: 10.6 % (ref 0–11)
NEUTROPHILS # BLD AUTO: 6 K/UL (ref 1.8–8.9)
NEUTROPHILS NFR BLD AUTO: 57.5 % (ref 38.5–71.5)
PHOSPHATE SERPL-MCNC: 2.8 MG/DL (ref 2.5–4.9)
PLATELET # BLD AUTO: 380 K/UL (ref 152–348)
POTASSIUM SERPL-SCNC: 3.2 MMOL/L (ref 3.5–5.1)
RBC # BLD AUTO: 4.28 MIL/UL (ref 4.06–5.63)
WBC # BLD AUTO: 10.4 K/UL (ref 3.6–10.2)
WS STN SPEC: 5.9 G/DL (ref 6.4–8.2)

## 2017-12-21 PROCEDURE — 0DBL8ZX EXCISION OF TRANSVERSE COLON, VIA NATURAL OR ARTIFICIAL OPENING ENDOSCOPIC, DIAGNOSTIC: ICD-10-PCS | Performed by: INTERNAL MEDICINE

## 2017-12-21 PROCEDURE — 0DBE8ZX EXCISION OF LARGE INTESTINE, VIA NATURAL OR ARTIFICIAL OPENING ENDOSCOPIC, DIAGNOSTIC: ICD-10-PCS | Performed by: INTERNAL MEDICINE

## 2017-12-21 RX ADMIN — DEXTROSE SCH MG: 50 INJECTION, SOLUTION INTRAVENOUS at 20:53

## 2017-12-21 RX ADMIN — VANCOMYCIN HYDROCHLORIDE SCH MG: 500 INJECTION, POWDER, LYOPHILIZED, FOR SOLUTION INTRAVENOUS at 14:10

## 2017-12-21 RX ADMIN — DEXTROSE SCH MG: 50 INJECTION, SOLUTION INTRAVENOUS at 09:11

## 2017-12-21 RX ADMIN — NYSTATIN SCH APPLIC: 100000 CREAM TOPICAL at 20:51

## 2017-12-21 RX ADMIN — NYSTATIN SCH APPLIC: 100000 CREAM TOPICAL at 10:17

## 2017-12-21 RX ADMIN — DEXTROSE SCH MLS/HR: 50 INJECTION, SOLUTION INTRAVENOUS at 11:05

## 2017-12-21 RX ADMIN — VANCOMYCIN HYDROCHLORIDE SCH MG: 500 INJECTION, POWDER, LYOPHILIZED, FOR SOLUTION INTRAVENOUS at 23:52

## 2017-12-21 RX ADMIN — DONEPEZIL HYDROCHLORIDE SCH MG: 10 TABLET, FILM COATED ORAL at 20:50

## 2017-12-21 RX ADMIN — METRONIDAZOLE SCH MLS/HR: 500 SOLUTION INTRAVENOUS at 10:18

## 2017-12-21 RX ADMIN — DEXTROSE SCH MLS/HR: 50 INJECTION, SOLUTION INTRAVENOUS at 12:25

## 2017-12-21 RX ADMIN — VANCOMYCIN HYDROCHLORIDE SCH MG: 500 INJECTION, POWDER, LYOPHILIZED, FOR SOLUTION INTRAVENOUS at 17:33

## 2017-12-21 RX ADMIN — SODIUM CHLORIDE PRN MLS/HR: 0.9 INJECTION, SOLUTION INTRAVENOUS at 06:10

## 2017-12-21 RX ADMIN — MEMANTINE HYDROCHLORIDE SCH MG: 10 TABLET ORAL at 20:50

## 2017-12-21 RX ADMIN — Medication SCH MG: at 09:11

## 2017-12-21 RX ADMIN — DOCUSATE SODIUM SCH MG: 100 CAPSULE, LIQUID FILLED ORAL at 09:11

## 2017-12-21 RX ADMIN — METRONIDAZOLE SCH MLS/HR: 500 SOLUTION INTRAVENOUS at 17:35

## 2017-12-21 NOTE — NUR
SURGERY CALLED TO SEE IF PATIENT IS READY FOR PICKUP FOR COLONOSCOPY. INFORMED SURGERY NURSE 
THAT IT WAS REPORTED THAT PROCEDURE IS SCHEDULED FOR 1030AM. SURGERY CLARIFIED PROCEDURE IS 
SCHEDULED FOR 0700AM. INFORMED SURGERY THAT PATIENT IS ALREADY FULLY READY AND PREPPED, 
READY FOR PICK-UP. VSS. PATIENT DENIES PAIN OR DISCOMFORT. IVF INFUSING WELL TO LEFT FA. NO 
RESP. DISTRESS NOTED. CALL LIGHT IN REACH. BED ALARM ON. ALL NEEDS ATTENDED. WILL CONTINUE 
TO MONITOR AND ASSESS.

## 2017-12-21 NOTE — NUR
PATIENTS DIAPER CHANGED. LIGHT BROWN LIQUID NOTED IN DIAPER WITH MUCOUS NOTED. TAP WATER 
ENEMA X2 GIVEN AND PATIENT PLACED ON BEDPAN. CLEAR LIQUID NOTED. ALL NEEDS ATTENDED, WILL 
CONTINUE TO MONITOR AND ASSESS.

## 2017-12-21 NOTE — NUR
RECEIVED PATIENT AWAKE IN BED WATCHING TV AND EATING. PATIENT IS A/O X2, VERY FORGETFUL BUT 
PLEASANT AND COOPERATIVE WITH CARE AND STAFF. DENIES ANY PAIN OR DISCOMFORT NO RESP. 
DISTRESS NOTED. IVF INFUSING WELL TO LEFT FA #22 GAUGE. BED ALARM ON. CALL LIGHT IN REACH. 
ALL NEEDS ATTENDED. WILL CONTINUE TO MONITOR.

## 2017-12-21 NOTE — NUR
Patient in bed resting. No s/s of distress during my shift. Pt been having loose stools 
during the day, an small specimen was collected and sent to lab. Procedures were done and 
medications were given as ordered, patient have been cooperative with all interventions. 
Z-guard and Nystatin applied on the buttock and groin area for skin protection. Iv fluids 
are running, new iv site was inserted, documented. A friend visited and brought a sweater 
for patient, documented on the belongings sheet. Safety and comfort provided during the day. 
Will continue monitoring.

## 2017-12-22 VITALS — DIASTOLIC BLOOD PRESSURE: 77 MMHG | SYSTOLIC BLOOD PRESSURE: 124 MMHG

## 2017-12-22 VITALS — SYSTOLIC BLOOD PRESSURE: 133 MMHG | DIASTOLIC BLOOD PRESSURE: 87 MMHG

## 2017-12-22 VITALS — SYSTOLIC BLOOD PRESSURE: 122 MMHG | DIASTOLIC BLOOD PRESSURE: 76 MMHG

## 2017-12-22 LAB
ALP SERPL-CCNC: 149 U/L (ref 50–136)
ALT SERPL W/O P-5'-P-CCNC: 13 U/L (ref 16–63)
AST SERPL-CCNC: 20 U/L (ref 15–37)
BASOPHILS # BLD AUTO: 0.1 K/UL (ref 0–8)
BASOPHILS NFR BLD AUTO: 0.5 % (ref 0–2)
BILIRUB SERPL-MCNC: 0.2 MG/DL (ref 0.2–1)
BUN SERPL-MCNC: 12 MG/DL (ref 7–18)
CHLORIDE SERPL-SCNC: 106 MMOL/L (ref 98–107)
CO2 SERPL-SCNC: 26 MMOL/L (ref 21–32)
CREAT SERPL-MCNC: 1.1 MG/DL (ref 0.6–1.3)
EOSINOPHIL # BLD AUTO: 0.5 K/UL (ref 0–0.7)
EOSINOPHIL NFR BLD AUTO: 4 % (ref 0–7)
GLUCOSE SERPL-MCNC: 93 MG/DL (ref 74–106)
HCT VFR BLD AUTO: 29.4 % (ref 36.7–47.1)
HGB BLD-MCNC: 9.5 G/DL (ref 12.5–16.3)
LYMPHOCYTES # BLD AUTO: 3.3 K/UL (ref 20–40)
LYMPHOCYTES NFR BLD AUTO: 27.6 % (ref 20.5–51.5)
MAGNESIUM SERPL-MCNC: 1.8 MG/DL (ref 1.8–2.4)
MCH RBC QN AUTO: 26.2 UUG (ref 23.8–33.4)
MCHC RBC AUTO-ENTMCNC: 32 G/DL (ref 32.5–36.3)
MCV RBC AUTO: 80.8 FL (ref 73–96.2)
MONOCYTES # BLD AUTO: 1.3 K/UL (ref 2–10)
MONOCYTES NFR BLD AUTO: 10.7 % (ref 0–11)
NEUTROPHILS # BLD AUTO: 6.8 K/UL (ref 1.8–8.9)
NEUTROPHILS NFR BLD AUTO: 57.2 % (ref 38.5–71.5)
PHOSPHATE SERPL-MCNC: 2.6 MG/DL (ref 2.5–4.9)
PLATELET # BLD AUTO: 337 K/UL (ref 152–348)
POTASSIUM SERPL-SCNC: 3.6 MMOL/L (ref 3.5–5.1)
RBC # BLD AUTO: 3.64 MIL/UL (ref 4.06–5.63)
WBC # BLD AUTO: 11.9 K/UL (ref 3.6–10.2)
WS STN SPEC: 5.3 G/DL (ref 6.4–8.2)

## 2017-12-22 RX ADMIN — Medication SCH MG: at 08:15

## 2017-12-22 RX ADMIN — METRONIDAZOLE SCH MLS/HR: 500 SOLUTION INTRAVENOUS at 10:32

## 2017-12-22 RX ADMIN — VANCOMYCIN HYDROCHLORIDE SCH MG: 500 INJECTION, POWDER, LYOPHILIZED, FOR SOLUTION INTRAVENOUS at 17:26

## 2017-12-22 RX ADMIN — METRONIDAZOLE SCH MLS/HR: 500 SOLUTION INTRAVENOUS at 17:24

## 2017-12-22 RX ADMIN — DEXTROSE SCH MG: 50 INJECTION, SOLUTION INTRAVENOUS at 08:14

## 2017-12-22 RX ADMIN — NYSTATIN SCH APPLIC: 100000 CREAM TOPICAL at 08:17

## 2017-12-22 RX ADMIN — VANCOMYCIN HYDROCHLORIDE SCH MG: 500 INJECTION, POWDER, LYOPHILIZED, FOR SOLUTION INTRAVENOUS at 12:56

## 2017-12-22 RX ADMIN — ACETAMINOPHEN PRN MG: 325 TABLET ORAL at 00:42

## 2017-12-22 RX ADMIN — METRONIDAZOLE SCH MLS/HR: 500 SOLUTION INTRAVENOUS at 01:27

## 2017-12-22 RX ADMIN — VANCOMYCIN HYDROCHLORIDE SCH MG: 500 INJECTION, POWDER, LYOPHILIZED, FOR SOLUTION INTRAVENOUS at 05:17

## 2017-12-22 RX ADMIN — DOCUSATE SODIUM SCH MG: 100 CAPSULE, LIQUID FILLED ORAL at 08:14

## 2017-12-22 RX ADMIN — SODIUM CHLORIDE PRN MLS/HR: 0.9 INJECTION, SOLUTION INTRAVENOUS at 00:11

## 2017-12-22 NOTE — NUR
PATIENT ASLEEP IN BED. NO S/S OF PAIN OR DISCOMFORT. NO RESP. DISTRESS NOTED.  IVF INFUSING 
WELL. VSS. BED ALARM ON. CALL LIGHT IN REACH. ALL NEEDS ATTENDED.

## 2017-12-22 NOTE — NUR
RECEIVED ORDERS FROM SHAINA BURKS TO DISCHARGE PATIENT TO [facility]. DISCHARGE 
ORDERS EXPLAINED TO PATIENT WITH MEDICATION LIST.REPORT ENDORSED TO TONA ALVA AT THE 
FACILITY. ID BAND AND IV REMOVED, AMBULANCE STAFF PICKED PATIENT UP IN SAFE AND STABLE 
CONDITION. COMFORT AND SAFETY MEASURES PROVIDED BY STAFF DURING PATIENT'S HOSPITAL STAY

## 2017-12-22 NOTE — NUR
PATIENT NOTED ASLEEP WITH NO OBVIOUS SIGNS OF PAIN OR DISCOMFORT, CALL LIGHT WITHIN REACH, 
BED IN LOW POSITION. WILL CONTINUE TO MONITOR PATIENT

## 2017-12-22 NOTE — NUR
PATIENT IS AWAKE, AOX2 WITH FORGETFULNESS AND IN A PLEASANT MOOD. NO SIGNS OR SYMPTOMS OF 
DISTRESS OR DISCOMFORT NOTED AT PRESENT, WILL CONTINUE TO MONITOR PATIENT

## 2017-12-27 LAB
BAKER'S YEAST IGA QN: 30.7 UNITS (ref 0–24.9)
BAKER'S YEAST IGG QN: 70.2 UNITS (ref 0–24.9)
P-ANCA ATYPICAL TITR SER IF: (no result) TITER

## 2018-01-15 ENCOUNTER — HOSPITAL ENCOUNTER (INPATIENT)
Dept: HOSPITAL 12 - ER | Age: 83
LOS: 3 days | Discharge: NURSING, REHAB OR LONG-TERM CARE | DRG: 391 | End: 2018-01-18
Attending: INTERNAL MEDICINE | Admitting: INTERNAL MEDICINE
Payer: MEDICARE

## 2018-01-15 VITALS — SYSTOLIC BLOOD PRESSURE: 138 MMHG | DIASTOLIC BLOOD PRESSURE: 81 MMHG

## 2018-01-15 VITALS — BODY MASS INDEX: 19.29 KG/M2 | HEIGHT: 66 IN | WEIGHT: 120 LBS

## 2018-01-15 VITALS — DIASTOLIC BLOOD PRESSURE: 83 MMHG | SYSTOLIC BLOOD PRESSURE: 139 MMHG

## 2018-01-15 DIAGNOSIS — E86.0: ICD-10-CM

## 2018-01-15 DIAGNOSIS — Z87.440: ICD-10-CM

## 2018-01-15 DIAGNOSIS — G92: ICD-10-CM

## 2018-01-15 DIAGNOSIS — G30.9: ICD-10-CM

## 2018-01-15 DIAGNOSIS — N28.1: ICD-10-CM

## 2018-01-15 DIAGNOSIS — K76.89: ICD-10-CM

## 2018-01-15 DIAGNOSIS — M81.0: ICD-10-CM

## 2018-01-15 DIAGNOSIS — I50.32: ICD-10-CM

## 2018-01-15 DIAGNOSIS — K58.1: Primary | ICD-10-CM

## 2018-01-15 DIAGNOSIS — K21.9: ICD-10-CM

## 2018-01-15 DIAGNOSIS — E43: ICD-10-CM

## 2018-01-15 DIAGNOSIS — I11.0: ICD-10-CM

## 2018-01-15 DIAGNOSIS — M19.90: ICD-10-CM

## 2018-01-15 DIAGNOSIS — D50.0: ICD-10-CM

## 2018-01-15 DIAGNOSIS — F32.9: ICD-10-CM

## 2018-01-15 DIAGNOSIS — F02.80: ICD-10-CM

## 2018-01-15 DIAGNOSIS — N17.0: ICD-10-CM

## 2018-01-15 DIAGNOSIS — E87.6: ICD-10-CM

## 2018-01-15 LAB
ALP SERPL-CCNC: 147 U/L (ref 50–136)
ALT SERPL W/O P-5'-P-CCNC: 17 U/L (ref 16–63)
AST SERPL-CCNC: 21 U/L (ref 15–37)
BASOPHILS # BLD AUTO: 0 K/UL (ref 0–8)
BASOPHILS NFR BLD AUTO: 0.4 % (ref 0–2)
BILIRUB DIRECT SERPL-MCNC: 0.1 MG/DL (ref 0–0.2)
BILIRUB SERPL-MCNC: 0.3 MG/DL (ref 0.2–1)
BUN SERPL-MCNC: 17 MG/DL (ref 7–18)
CHLORIDE SERPL-SCNC: 96 MMOL/L (ref 98–107)
CO2 SERPL-SCNC: 30 MMOL/L (ref 21–32)
CREAT SERPL-MCNC: 1.2 MG/DL (ref 0.6–1.3)
EOSINOPHIL # BLD AUTO: 0.2 K/UL (ref 0–0.7)
EOSINOPHIL NFR BLD AUTO: 1.7 % (ref 0–7)
GLUCOSE SERPL-MCNC: 99 MG/DL (ref 74–106)
HCT VFR BLD AUTO: 37.3 % (ref 36.7–47.1)
HGB BLD-MCNC: 12.5 G/DL (ref 12.5–16.3)
LYMPHOCYTES # BLD AUTO: 3 K/UL (ref 20–40)
LYMPHOCYTES NFR BLD AUTO: 29.6 % (ref 20.5–51.5)
MCH RBC QN AUTO: 27.4 UUG (ref 23.8–33.4)
MCHC RBC AUTO-ENTMCNC: 33 G/DL (ref 32.5–36.3)
MCV RBC AUTO: 82.1 FL (ref 73–96.2)
MONOCYTES # BLD AUTO: 1.2 K/UL (ref 2–10)
MONOCYTES NFR BLD AUTO: 12.1 % (ref 0–11)
NEUTROPHILS # BLD AUTO: 5.7 K/UL (ref 1.8–8.9)
NEUTROPHILS NFR BLD AUTO: 56.2 % (ref 38.5–71.5)
PLATELET # BLD AUTO: 488 K/UL (ref 152–348)
POTASSIUM SERPL-SCNC: 3.9 MMOL/L (ref 3.5–5.1)
RBC # BLD AUTO: 4.55 MIL/UL (ref 4.06–5.63)
WBC # BLD AUTO: 10.2 K/UL (ref 3.6–10.2)
WS STN SPEC: 7.3 G/DL (ref 6.4–8.2)

## 2018-01-15 PROCEDURE — C9113 INJ PANTOPRAZOLE SODIUM, VIA: HCPCS

## 2018-01-15 PROCEDURE — A4663 DIALYSIS BLOOD PRESSURE CUFF: HCPCS

## 2018-01-15 RX ADMIN — DEXTROSE AND SODIUM CHLORIDE PRN MLS/HR: 5; .45 INJECTION, SOLUTION INTRAVENOUS at 20:11

## 2018-01-16 VITALS — SYSTOLIC BLOOD PRESSURE: 126 MMHG | DIASTOLIC BLOOD PRESSURE: 85 MMHG

## 2018-01-16 VITALS — SYSTOLIC BLOOD PRESSURE: 105 MMHG | DIASTOLIC BLOOD PRESSURE: 82 MMHG

## 2018-01-16 VITALS — SYSTOLIC BLOOD PRESSURE: 126 MMHG | DIASTOLIC BLOOD PRESSURE: 80 MMHG

## 2018-01-16 VITALS — DIASTOLIC BLOOD PRESSURE: 91 MMHG | SYSTOLIC BLOOD PRESSURE: 130 MMHG

## 2018-01-16 LAB
ALP SERPL-CCNC: 134 U/L (ref 50–136)
ALT SERPL W/O P-5'-P-CCNC: 17 U/L (ref 16–63)
AST SERPL-CCNC: 22 U/L (ref 15–37)
BASOPHILS # BLD AUTO: 0 K/UL (ref 0–8)
BASOPHILS NFR BLD AUTO: 0.3 % (ref 0–2)
BILIRUB SERPL-MCNC: 0.3 MG/DL (ref 0.2–1)
BUN SERPL-MCNC: 11 MG/DL (ref 7–18)
CHLORIDE SERPL-SCNC: 99 MMOL/L (ref 98–107)
CO2 SERPL-SCNC: 28 MMOL/L (ref 21–32)
CREAT SERPL-MCNC: 1 MG/DL (ref 0.6–1.3)
EOSINOPHIL # BLD AUTO: 0.3 K/UL (ref 0–0.7)
EOSINOPHIL NFR BLD AUTO: 2.9 % (ref 0–7)
GLUCOSE SERPL-MCNC: 93 MG/DL (ref 74–106)
HCT VFR BLD AUTO: 34.2 % (ref 36.7–47.1)
HGB BLD-MCNC: 11.2 G/DL (ref 12.5–16.3)
IRON SERPL-MCNC: 26 UG/DL (ref 50–175)
LYMPHOCYTES # BLD AUTO: 2.7 K/UL (ref 20–40)
LYMPHOCYTES NFR BLD AUTO: 23.1 % (ref 20.5–51.5)
MAGNESIUM SERPL-MCNC: 1.9 MG/DL (ref 1.8–2.4)
MCH RBC QN AUTO: 27.1 UUG (ref 23.8–33.4)
MCHC RBC AUTO-ENTMCNC: 33 G/DL (ref 32.5–36.3)
MCV RBC AUTO: 82.6 FL (ref 73–96.2)
MONOCYTES # BLD AUTO: 1.5 K/UL (ref 2–10)
MONOCYTES NFR BLD AUTO: 12.7 % (ref 0–11)
NEUTROPHILS # BLD AUTO: 7.3 K/UL (ref 1.8–8.9)
NEUTROPHILS NFR BLD AUTO: 61 % (ref 38.5–71.5)
PHOSPHATE SERPL-MCNC: 3.4 MG/DL (ref 2.5–4.9)
PLATELET # BLD AUTO: 435 K/UL (ref 152–348)
POTASSIUM SERPL-SCNC: 3.5 MMOL/L (ref 3.5–5.1)
RBC # BLD AUTO: 4.14 MIL/UL (ref 4.06–5.63)
WBC # BLD AUTO: 11.9 K/UL (ref 3.6–10.2)
WS STN SPEC: 6.4 G/DL (ref 6.4–8.2)

## 2018-01-16 RX ADMIN — DEXTROSE SCH MG: 50 INJECTION, SOLUTION INTRAVENOUS at 08:20

## 2018-01-16 RX ADMIN — NYSTATIN AND TRIAMCINOLONE ACETONIDE SCH GM: 100000; 1 CREAM TOPICAL at 21:07

## 2018-01-16 RX ADMIN — ANORECTAL OINTMENT SCH APPLIC: 15.7; .44; 24; 20.6 OINTMENT TOPICAL at 08:30

## 2018-01-16 RX ADMIN — ANORECTAL OINTMENT SCH APPLIC: 15.7; .44; 24; 20.6 OINTMENT TOPICAL at 21:06

## 2018-01-17 VITALS — DIASTOLIC BLOOD PRESSURE: 82 MMHG | SYSTOLIC BLOOD PRESSURE: 109 MMHG

## 2018-01-17 VITALS — DIASTOLIC BLOOD PRESSURE: 76 MMHG | SYSTOLIC BLOOD PRESSURE: 120 MMHG

## 2018-01-17 VITALS — DIASTOLIC BLOOD PRESSURE: 89 MMHG | SYSTOLIC BLOOD PRESSURE: 121 MMHG

## 2018-01-17 VITALS — DIASTOLIC BLOOD PRESSURE: 91 MMHG | SYSTOLIC BLOOD PRESSURE: 136 MMHG

## 2018-01-17 LAB
ALP SERPL-CCNC: 144 U/L (ref 50–136)
ALT SERPL W/O P-5'-P-CCNC: 18 U/L (ref 16–63)
AST SERPL-CCNC: 27 U/L (ref 15–37)
BASOPHILS # BLD AUTO: 0.1 K/UL (ref 0–8)
BASOPHILS NFR BLD AUTO: 0.4 % (ref 0–2)
BILIRUB SERPL-MCNC: 0.3 MG/DL (ref 0.2–1)
BUN SERPL-MCNC: 7 MG/DL (ref 7–18)
CHLORIDE SERPL-SCNC: 99 MMOL/L (ref 98–107)
CO2 SERPL-SCNC: 30 MMOL/L (ref 21–32)
CREAT SERPL-MCNC: 1 MG/DL (ref 0.6–1.3)
EOSINOPHIL # BLD AUTO: 0.2 K/UL (ref 0–0.7)
EOSINOPHIL NFR BLD AUTO: 1.7 % (ref 0–7)
GLUCOSE SERPL-MCNC: 101 MG/DL (ref 74–106)
HCT VFR BLD AUTO: 36.4 % (ref 36.7–47.1)
HGB BLD-MCNC: 12.1 G/DL (ref 12.5–16.3)
LYMPHOCYTES # BLD AUTO: 2.2 K/UL (ref 20–40)
LYMPHOCYTES NFR BLD AUTO: 17 % (ref 20.5–51.5)
MAGNESIUM SERPL-MCNC: 1.9 MG/DL (ref 1.8–2.4)
MCH RBC QN AUTO: 27.3 UUG (ref 23.8–33.4)
MCHC RBC AUTO-ENTMCNC: 33 G/DL (ref 32.5–36.3)
MCV RBC AUTO: 82.2 FL (ref 73–96.2)
MONOCYTES # BLD AUTO: 1.4 K/UL (ref 2–10)
MONOCYTES NFR BLD AUTO: 10.8 % (ref 0–11)
NEUTROPHILS # BLD AUTO: 9 K/UL (ref 1.8–8.9)
NEUTROPHILS NFR BLD AUTO: 70.1 % (ref 38.5–71.5)
PHOSPHATE SERPL-MCNC: 3.4 MG/DL (ref 2.5–4.9)
PLATELET # BLD AUTO: 486 K/UL (ref 152–348)
POTASSIUM SERPL-SCNC: 3.3 MMOL/L (ref 3.5–5.1)
RBC # BLD AUTO: 4.42 MIL/UL (ref 4.06–5.63)
WBC # BLD AUTO: 12.8 K/UL (ref 3.6–10.2)
WS STN SPEC: 6.7 G/DL (ref 6.4–8.2)

## 2018-01-17 RX ADMIN — CLOTRIMAZOLE AND BETAMETHASONE DIPROPIONATE SCH GM: 10; .5 CREAM TOPICAL at 10:29

## 2018-01-17 RX ADMIN — ANORECTAL OINTMENT SCH APPLIC: 15.7; .44; 24; 20.6 OINTMENT TOPICAL at 09:20

## 2018-01-17 RX ADMIN — DEXTROSE SCH MG: 50 INJECTION, SOLUTION INTRAVENOUS at 09:18

## 2018-01-17 RX ADMIN — NYSTATIN AND TRIAMCINOLONE ACETONIDE SCH APPLIC: 100000; 1 CREAM TOPICAL at 09:19

## 2018-01-17 RX ADMIN — DEXTROSE AND SODIUM CHLORIDE PRN MLS/HR: 5; .45 INJECTION, SOLUTION INTRAVENOUS at 01:07

## 2018-01-17 RX ADMIN — ANORECTAL OINTMENT SCH APPLIC: 15.7; .44; 24; 20.6 OINTMENT TOPICAL at 21:32

## 2018-01-17 RX ADMIN — DEXTROSE AND SODIUM CHLORIDE PRN MLS/HR: 5; .45 INJECTION, SOLUTION INTRAVENOUS at 12:58

## 2018-01-17 RX ADMIN — CLOTRIMAZOLE AND BETAMETHASONE DIPROPIONATE SCH GM: 10; .5 CREAM TOPICAL at 21:32

## 2018-01-18 ENCOUNTER — HOSPITAL ENCOUNTER (INPATIENT)
Dept: HOSPITAL 12 - REHAB | Age: 83
LOS: 4 days | Discharge: NURSING, REHAB OR LONG-TERM CARE | DRG: 947 | End: 2018-01-22
Attending: PHYSICAL MEDICINE & REHABILITATION | Admitting: PHYSICAL MEDICINE & REHABILITATION
Payer: MEDICARE

## 2018-01-18 VITALS — WEIGHT: 120 LBS | BODY MASS INDEX: 19.29 KG/M2 | HEIGHT: 66 IN

## 2018-01-18 VITALS — DIASTOLIC BLOOD PRESSURE: 85 MMHG | SYSTOLIC BLOOD PRESSURE: 144 MMHG

## 2018-01-18 VITALS — DIASTOLIC BLOOD PRESSURE: 86 MMHG | SYSTOLIC BLOOD PRESSURE: 126 MMHG

## 2018-01-18 VITALS — DIASTOLIC BLOOD PRESSURE: 80 MMHG | SYSTOLIC BLOOD PRESSURE: 125 MMHG

## 2018-01-18 VITALS — SYSTOLIC BLOOD PRESSURE: 108 MMHG | DIASTOLIC BLOOD PRESSURE: 75 MMHG

## 2018-01-18 DIAGNOSIS — F41.9: ICD-10-CM

## 2018-01-18 DIAGNOSIS — K59.00: ICD-10-CM

## 2018-01-18 DIAGNOSIS — F32.9: ICD-10-CM

## 2018-01-18 DIAGNOSIS — K21.9: ICD-10-CM

## 2018-01-18 DIAGNOSIS — K64.9: ICD-10-CM

## 2018-01-18 DIAGNOSIS — G93.49: ICD-10-CM

## 2018-01-18 DIAGNOSIS — R53.1: ICD-10-CM

## 2018-01-18 DIAGNOSIS — R53.81: Primary | ICD-10-CM

## 2018-01-18 DIAGNOSIS — I10: ICD-10-CM

## 2018-01-18 DIAGNOSIS — G90.9: ICD-10-CM

## 2018-01-18 DIAGNOSIS — F03.90: ICD-10-CM

## 2018-01-18 PROCEDURE — A4217 STERILE WATER/SALINE, 500 ML: HCPCS

## 2018-01-18 PROCEDURE — A4663 DIALYSIS BLOOD PRESSURE CUFF: HCPCS

## 2018-01-18 RX ADMIN — DEXTROSE SCH MG: 50 INJECTION, SOLUTION INTRAVENOUS at 09:25

## 2018-01-18 RX ADMIN — DONEPEZIL HYDROCHLORIDE SCH MG: 10 TABLET, FILM COATED ORAL at 21:41

## 2018-01-18 RX ADMIN — CLOTRIMAZOLE AND BETAMETHASONE DIPROPIONATE SCH APPLIC: 10; .5 CREAM TOPICAL at 09:25

## 2018-01-18 RX ADMIN — CLOTRIMAZOLE AND BETAMETHASONE DIPROPIONATE SCH GM: 10; .5 CREAM TOPICAL at 21:42

## 2018-01-18 RX ADMIN — DEXTROSE AND SODIUM CHLORIDE PRN MLS/HR: 5; .45 INJECTION, SOLUTION INTRAVENOUS at 16:55

## 2018-01-18 RX ADMIN — MEMANTINE HYDROCHLORIDE SCH MG: 10 TABLET ORAL at 21:41

## 2018-01-18 RX ADMIN — DEXTROSE AND SODIUM CHLORIDE PRN MLS/HR: 5; .45 INJECTION, SOLUTION INTRAVENOUS at 02:30

## 2018-01-18 RX ADMIN — ANORECTAL OINTMENT SCH APPLIC: 15.7; .44; 24; 20.6 OINTMENT TOPICAL at 09:24

## 2018-01-18 NOTE — NUR
Patient admitted is a 83y/o M with diagnosis of Debility/Encephalopathy with Hx of Dementia, 
Encephalopathy, HTN, GERD, GI Bleed, Depression, Insomnia, Anxiety, Hemorrhoids and 
Constipation. Patient is alert and verbally responsive. Able to make needs known. 
Transferred to Rehab in stable condition. No c/o pain and discomfort. No acute distress. No 
SOB. IV site on L wrist. Patent and intact. Kept clean and dry. All needs attended to 
promptly. Call light within reach. Will continue to monitor. Dr. Infante seen and 
examined patient.

## 2018-01-19 VITALS — DIASTOLIC BLOOD PRESSURE: 55 MMHG | SYSTOLIC BLOOD PRESSURE: 93 MMHG

## 2018-01-19 VITALS — DIASTOLIC BLOOD PRESSURE: 72 MMHG | SYSTOLIC BLOOD PRESSURE: 109 MMHG

## 2018-01-19 VITALS — DIASTOLIC BLOOD PRESSURE: 91 MMHG | SYSTOLIC BLOOD PRESSURE: 125 MMHG

## 2018-01-19 RX ADMIN — CLOTRIMAZOLE AND BETAMETHASONE DIPROPIONATE SCH APPLIC: 10; .5 CREAM TOPICAL at 21:00

## 2018-01-19 RX ADMIN — THERA TABS SCH UDTAB: TAB at 08:46

## 2018-01-19 RX ADMIN — Medication SCH MG: at 08:46

## 2018-01-19 RX ADMIN — CLOTRIMAZOLE AND BETAMETHASONE DIPROPIONATE SCH APPLIC: 10; .5 CREAM TOPICAL at 08:46

## 2018-01-19 RX ADMIN — PANTOPRAZOLE SODIUM SCH MG: 40 TABLET, DELAYED RELEASE ORAL at 06:38

## 2018-01-19 RX ADMIN — DONEPEZIL HYDROCHLORIDE SCH MG: 10 TABLET, FILM COATED ORAL at 20:59

## 2018-01-19 RX ADMIN — PANTOPRAZOLE SODIUM SCH MG: 40 TABLET, DELAYED RELEASE ORAL at 17:05

## 2018-01-19 RX ADMIN — MEMANTINE HYDROCHLORIDE SCH MG: 10 TABLET ORAL at 20:59

## 2018-01-19 RX ADMIN — SODIUM CHLORIDE PRN MLS/HR: 0.9 INJECTION, SOLUTION INTRAVENOUS at 12:15

## 2018-01-19 NOTE — NUR
Patient had syncopal episode. Patient was up with physical therapy and therapist claimed 
patient lost consciousness while using the toilet. Patient regained consciousness less than 
a minute. Patient had 1 episode of vomiting after regaining consciousness, VS was as 
follows: Spo2 89% BP- 135/105 WA-77 Blood sugar 118.

## 2018-01-19 NOTE — NUR
Patient had 1 episode of loose stools, no blood noted. Still on IV fluids. Offered fluids as 
tolerated. Denies any pain VSS

## 2018-01-19 NOTE — NUR
Patient slept comfortably throughout the night. No c/o pain and discomfort. No acute 
distress. No SOB. IV site on L wrist patent and intact. On Isolation for possible C-diff. 
Results pending. Had large BM this AM. Pericare provided. Kept clean and dry. All needs 
attended to promptly. Call light within reach. Will continue to monitor.

## 2018-01-19 NOTE — NUR
Patient awake, alert x3. In bed resting with O2 sat at 98% with O2 at 2Lpm. Informed Dr. Infante, patient for GI consult. To start NS at 75cc/hr

## 2018-01-20 VITALS — DIASTOLIC BLOOD PRESSURE: 77 MMHG | SYSTOLIC BLOOD PRESSURE: 126 MMHG

## 2018-01-20 VITALS — SYSTOLIC BLOOD PRESSURE: 122 MMHG | DIASTOLIC BLOOD PRESSURE: 81 MMHG

## 2018-01-20 LAB
BASOPHILS # BLD AUTO: 0 K/UL (ref 0–8)
BASOPHILS NFR BLD AUTO: 0.2 % (ref 0–2)
BUN SERPL-MCNC: 15 MG/DL (ref 7–18)
CHLORIDE SERPL-SCNC: 100 MMOL/L (ref 98–107)
CO2 SERPL-SCNC: 27 MMOL/L (ref 21–32)
CREAT SERPL-MCNC: 1 MG/DL (ref 0.6–1.3)
EOSINOPHIL # BLD AUTO: 0.1 K/UL (ref 0–0.7)
EOSINOPHIL NFR BLD AUTO: 0.9 % (ref 0–7)
GLUCOSE SERPL-MCNC: 92 MG/DL (ref 74–106)
HCT VFR BLD AUTO: 34.1 % (ref 36.7–47.1)
HGB BLD-MCNC: 11.1 G/DL (ref 12.5–16.3)
LYMPHOCYTES # BLD AUTO: 2.7 K/UL (ref 20–40)
LYMPHOCYTES NFR BLD AUTO: 18.7 % (ref 20.5–51.5)
MAGNESIUM SERPL-MCNC: 1.7 MG/DL (ref 1.8–2.4)
MCH RBC QN AUTO: 27.2 UUG (ref 23.8–33.4)
MCHC RBC AUTO-ENTMCNC: 33 G/DL (ref 32.5–36.3)
MCV RBC AUTO: 83.2 FL (ref 73–96.2)
MONOCYTES # BLD AUTO: 1.4 K/UL (ref 2–10)
MONOCYTES NFR BLD AUTO: 9.9 % (ref 0–11)
NEUTROPHILS # BLD AUTO: 10 K/UL (ref 1.8–8.9)
NEUTROPHILS NFR BLD AUTO: 70.3 % (ref 38.5–71.5)
PHOSPHATE SERPL-MCNC: 2.7 MG/DL (ref 2.5–4.9)
PLATELET # BLD AUTO: 414 K/UL (ref 152–348)
POTASSIUM SERPL-SCNC: 4.2 MMOL/L (ref 3.5–5.1)
RBC # BLD AUTO: 4.09 MIL/UL (ref 4.06–5.63)
WBC # BLD AUTO: 14.2 K/UL (ref 3.6–10.2)

## 2018-01-20 RX ADMIN — PANTOPRAZOLE SODIUM SCH MG: 40 TABLET, DELAYED RELEASE ORAL at 17:13

## 2018-01-20 RX ADMIN — SODIUM CHLORIDE PRN MLS/HR: 0.9 INJECTION, SOLUTION INTRAVENOUS at 20:30

## 2018-01-20 RX ADMIN — CLOTRIMAZOLE AND BETAMETHASONE DIPROPIONATE SCH APPLIC: 10; .5 CREAM TOPICAL at 20:40

## 2018-01-20 RX ADMIN — DONEPEZIL HYDROCHLORIDE SCH MG: 10 TABLET, FILM COATED ORAL at 20:30

## 2018-01-20 RX ADMIN — CLOTRIMAZOLE AND BETAMETHASONE DIPROPIONATE SCH APPLIC: 10; .5 CREAM TOPICAL at 09:00

## 2018-01-20 RX ADMIN — SODIUM CHLORIDE PRN MLS/HR: 0.9 INJECTION, SOLUTION INTRAVENOUS at 00:31

## 2018-01-20 RX ADMIN — PANTOPRAZOLE SODIUM SCH MG: 40 TABLET, DELAYED RELEASE ORAL at 06:14

## 2018-01-20 RX ADMIN — THERA TABS SCH UDTAB: TAB at 08:13

## 2018-01-20 RX ADMIN — Medication SCH MG: at 08:13

## 2018-01-20 RX ADMIN — MEMANTINE HYDROCHLORIDE SCH MG: 10 TABLET ORAL at 20:31

## 2018-01-20 NOTE — NUR
pt had a fall during the day. incident report done. neuro check done. pt remained to have 
baseline. no complaints of pain when reassessed. pt alert and oriented x 3. 02 taken off and 
tolerates room air. no new injuries noted on skin. awaiting results for x rays. pt took meds 
with apple sauce. fall precautions implemented . call light within reach. iv site intact. 
will endorse to night shift nurse.

## 2018-01-20 NOTE — NUR
Loose bowel movement and blood noted in stool. Sent to lab for C. diff testing. Will 
continue to monitor patient.

## 2018-01-20 NOTE — NUR
Received patient laying in bed. Alert and verbally responsive. Able to make needs known. 
Denies any pain and discomfort. No acute distress. No SOB. IV site on LFA. Patent and 
intact. Infusing NS @ 75cc/hr. Tolerating well. No s/s of fluid overload. On contact 
precautions for possible C-diff. All contact precautions taken. C-diff results pending. 
Patient kept clean and dry. All needs attended to promptly. Call light within reach. Will 
continue to monitor.

## 2018-01-20 NOTE — NUR
pt sen on rounding. pt continues to be on oxygen at 3l. pt tolerates. will reassess pt if 
oxygen is needed. pt has irregular hr through assessment on checking pulses . pt had an 
episode of syncope prior to shift. no signs and symptoms noted. no signs of orthostatic 
hypotension noted. pt able to swallow pills whole with applesauce. no aspiration noted. pt 
worked with therapy. other vitals signs stable. will continue to monitor.

## 2018-01-20 NOTE — NUR
Patient slept intermittently at night. Meds given and patient compliant. On IV fluids 75 mL/ 
hr. On 2L O2 via NC, tolerating well. No BM at this time. Stool has to be collected. All 
needs attended to promptly t/o the night. Safety measures maintained. Call light and 
personal belongings within reach. Will endorse to day shift RN. Continue to monitor.

## 2018-01-21 VITALS — DIASTOLIC BLOOD PRESSURE: 82 MMHG | SYSTOLIC BLOOD PRESSURE: 136 MMHG

## 2018-01-21 VITALS — SYSTOLIC BLOOD PRESSURE: 134 MMHG | DIASTOLIC BLOOD PRESSURE: 70 MMHG

## 2018-01-21 LAB
ALP SERPL-CCNC: 138 U/L (ref 50–136)
ALT SERPL W/O P-5'-P-CCNC: 15 U/L (ref 16–63)
AST SERPL-CCNC: 21 U/L (ref 15–37)
BASOPHILS # BLD AUTO: 0 K/UL (ref 0–8)
BASOPHILS NFR BLD AUTO: 0.2 % (ref 0–2)
BILIRUB SERPL-MCNC: 0.2 MG/DL (ref 0.2–1)
BUN SERPL-MCNC: 10 MG/DL (ref 7–18)
CHLORIDE SERPL-SCNC: 99 MMOL/L (ref 98–107)
CO2 SERPL-SCNC: 25 MMOL/L (ref 21–32)
CREAT SERPL-MCNC: 0.9 MG/DL (ref 0.6–1.3)
EOSINOPHIL # BLD AUTO: 0.1 K/UL (ref 0–0.7)
EOSINOPHIL NFR BLD AUTO: 1.2 % (ref 0–7)
GLUCOSE SERPL-MCNC: 151 MG/DL (ref 74–106)
HCT VFR BLD AUTO: 32.1 % (ref 36.7–47.1)
HGB BLD-MCNC: 10.6 G/DL (ref 12.5–16.3)
LYMPHOCYTES # BLD AUTO: 2.3 K/UL (ref 20–40)
LYMPHOCYTES NFR BLD AUTO: 18.7 % (ref 20.5–51.5)
MAGNESIUM SERPL-MCNC: 1.5 MG/DL (ref 1.8–2.4)
MCH RBC QN AUTO: 27.4 UUG (ref 23.8–33.4)
MCHC RBC AUTO-ENTMCNC: 33 G/DL (ref 32.5–36.3)
MCV RBC AUTO: 83.2 FL (ref 73–96.2)
MONOCYTES # BLD AUTO: 1 K/UL (ref 2–10)
MONOCYTES NFR BLD AUTO: 8.4 % (ref 0–11)
NEUTROPHILS # BLD AUTO: 8.7 K/UL (ref 1.8–8.9)
NEUTROPHILS NFR BLD AUTO: 71.5 % (ref 38.5–71.5)
PHOSPHATE SERPL-MCNC: 2.3 MG/DL (ref 2.5–4.9)
PLATELET # BLD AUTO: 398 K/UL (ref 152–348)
POTASSIUM SERPL-SCNC: 4 MMOL/L (ref 3.5–5.1)
RBC # BLD AUTO: 3.86 MIL/UL (ref 4.06–5.63)
WBC # BLD AUTO: 12.2 K/UL (ref 3.6–10.2)
WS STN SPEC: 6 G/DL (ref 6.4–8.2)

## 2018-01-21 RX ADMIN — THERA TABS SCH UDTAB: TAB at 08:22

## 2018-01-21 RX ADMIN — DONEPEZIL HYDROCHLORIDE SCH MG: 10 TABLET, FILM COATED ORAL at 20:35

## 2018-01-21 RX ADMIN — SODIUM CHLORIDE PRN MLS/HR: 0.9 INJECTION, SOLUTION INTRAVENOUS at 10:15

## 2018-01-21 RX ADMIN — MEMANTINE HYDROCHLORIDE SCH MG: 10 TABLET ORAL at 20:35

## 2018-01-21 RX ADMIN — CLOTRIMAZOLE AND BETAMETHASONE DIPROPIONATE SCH APPLIC: 10; .5 CREAM TOPICAL at 08:22

## 2018-01-21 RX ADMIN — Medication SCH MG: at 08:22

## 2018-01-21 RX ADMIN — SODIUM CHLORIDE PRN MLS/HR: 0.9 INJECTION, SOLUTION INTRAVENOUS at 23:20

## 2018-01-21 RX ADMIN — PANTOPRAZOLE SODIUM SCH MG: 40 TABLET, DELAYED RELEASE ORAL at 06:24

## 2018-01-21 RX ADMIN — CLOTRIMAZOLE AND BETAMETHASONE DIPROPIONATE SCH APPLIC: 10; .5 CREAM TOPICAL at 20:34

## 2018-01-21 RX ADMIN — PANTOPRAZOLE SODIUM SCH MG: 40 TABLET, DELAYED RELEASE ORAL at 17:00

## 2018-01-21 NOTE — NUR
pt seen on rounding. cna reported that pt had o2 below normal limits. reassessed pt. pt o2 
dropping on room air. raised head of bed. applied 02 at 2 liters. pt tolerates. pt continues 
to have c diff. contact isolation implemented. fluids runiing at 75cc/hr. site intact. neuro 
check done and remained baseline. will continue to monitor.

## 2018-01-21 NOTE — NUR
pt stable throughout the day. cdiff precautions removed because pt tested negative. no new 
injuries. still on 2l nc because pt shows desaturation on oxygen. no diarrhea noted. fluids 
running at 75cc/hr and intact. no antibiotics ordered. will endorse to night shift nurse.

## 2018-01-21 NOTE — NUR
Received pt lying on bed comfortably, alert and awake. Able to make needs known. No acute 
distress noted. No complaints of pain or discomfort. On o2 2LPM NC, no SOB noted. All due 
meds given as ordered and well tolerated. Kept clean, dry and comfortable. Encouraged to 
verbalize needs and concerns and to call for assistance if needed. Side rails up x2. Bed 
alarm on and in lowest position. Call light placed within reach. All needs attended.

## 2018-01-22 VITALS — DIASTOLIC BLOOD PRESSURE: 82 MMHG | SYSTOLIC BLOOD PRESSURE: 150 MMHG

## 2018-01-22 LAB
APPEARANCE UR: (no result)
BILIRUB UR QL STRIP: NEGATIVE
COLOR UR: YELLOW
DEPRECATED SQUAMOUS URNS QL MICRO: (no result) /HPF
GLUCOSE UR STRIP-MCNC: NEGATIVE MG/DL
HGB UR QL STRIP: (no result)
KETONES UR STRIP-MCNC: NEGATIVE MG/DL
LEUKOCYTE ESTERASE UR QL STRIP: (no result)
NITRITE UR QL STRIP: NEGATIVE
PH UR STRIP: 5.5 [PH] (ref 5–8)
PROT UR QL STRIP: NEGATIVE
RBC #/AREA URNS HPF: (no result) /HPF (ref 0–3)
SP GR UR STRIP: 1.01 (ref 1–1.03)
UROBILINOGEN UR STRIP-MCNC: 0.2 E.U./DL
WBC #/AREA URNS HPF: (no result) /HPF
WBC #/AREA URNS HPF: (no result) /HPF (ref 0–3)

## 2018-01-22 RX ADMIN — PANTOPRAZOLE SODIUM SCH MG: 40 TABLET, DELAYED RELEASE ORAL at 17:39

## 2018-01-22 RX ADMIN — MAGNESIUM SULFATE IN DEXTROSE SCH MLS/HR: 10 INJECTION, SOLUTION INTRAVENOUS at 15:13

## 2018-01-22 RX ADMIN — CLOTRIMAZOLE AND BETAMETHASONE DIPROPIONATE SCH APPLIC: 10; .5 CREAM TOPICAL at 09:04

## 2018-01-22 RX ADMIN — PANTOPRAZOLE SODIUM SCH MG: 40 TABLET, DELAYED RELEASE ORAL at 06:51

## 2018-01-22 RX ADMIN — Medication SCH MG: at 09:04

## 2018-01-22 RX ADMIN — THERA TABS SCH UDTAB: TAB at 09:04

## 2018-01-22 RX ADMIN — MAGNESIUM SULFATE IN DEXTROSE SCH MLS/HR: 10 INJECTION, SOLUTION INTRAVENOUS at 13:33

## 2018-01-22 NOTE — NUR
Pt slept well throughout the shift with no acute distress noted. No complaints of pain or 
discomfort. No SOB noted. IV on LFA intact and patent. Kept clean, dry and comfortable. 
Frequently checked for safety. turned and repositioned pt. Call light placed within reach. 
All needs attended.

## 2018-01-22 NOTE — NUR
Patient refused vaccinations and said he doesn't want one for this year. Discussed risks and 
benefits but patient still refused.

## 2018-01-22 NOTE — NUR
Report given to Cathleen ARIZA, [facility]. Routine discharge care done, pictures 
taken. Patient stable, alert x2-3. Denies any pain. Encouraged to call for needs at ask help 
when standing up.

## 2018-01-22 NOTE — NUR
Received patient awake, alert x1-2. Denies any pain, not in any form of distress. With IV 
intact and patent on NS at 75 cc/hr. Call light within reach. Encouraged to call for needs.

## 2018-08-01 ENCOUNTER — HOSPITAL ENCOUNTER (INPATIENT)
Dept: HOSPITAL 12 - ER | Age: 83
LOS: 4 days | Discharge: NURSING, REHAB OR LONG-TERM CARE | DRG: 871 | End: 2018-08-05
Admitting: INTERNAL MEDICINE
Payer: MEDICARE

## 2018-08-01 VITALS — DIASTOLIC BLOOD PRESSURE: 54 MMHG | SYSTOLIC BLOOD PRESSURE: 102 MMHG

## 2018-08-01 VITALS — SYSTOLIC BLOOD PRESSURE: 101 MMHG | DIASTOLIC BLOOD PRESSURE: 70 MMHG

## 2018-08-01 VITALS — WEIGHT: 140 LBS | BODY MASS INDEX: 21.22 KG/M2 | HEIGHT: 68 IN

## 2018-08-01 VITALS — SYSTOLIC BLOOD PRESSURE: 115 MMHG | DIASTOLIC BLOOD PRESSURE: 58 MMHG

## 2018-08-01 DIAGNOSIS — K51.90: ICD-10-CM

## 2018-08-01 DIAGNOSIS — E44.0: ICD-10-CM

## 2018-08-01 DIAGNOSIS — Z66: ICD-10-CM

## 2018-08-01 DIAGNOSIS — E55.9: ICD-10-CM

## 2018-08-01 DIAGNOSIS — K21.9: ICD-10-CM

## 2018-08-01 DIAGNOSIS — F20.9: ICD-10-CM

## 2018-08-01 DIAGNOSIS — Z16.11: ICD-10-CM

## 2018-08-01 DIAGNOSIS — E83.42: ICD-10-CM

## 2018-08-01 DIAGNOSIS — I10: ICD-10-CM

## 2018-08-01 DIAGNOSIS — A41.59: Primary | ICD-10-CM

## 2018-08-01 DIAGNOSIS — K64.9: ICD-10-CM

## 2018-08-01 DIAGNOSIS — F03.91: ICD-10-CM

## 2018-08-01 DIAGNOSIS — G47.00: ICD-10-CM

## 2018-08-01 DIAGNOSIS — F32.9: ICD-10-CM

## 2018-08-01 DIAGNOSIS — F41.9: ICD-10-CM

## 2018-08-01 DIAGNOSIS — Z87.440: ICD-10-CM

## 2018-08-01 DIAGNOSIS — N39.0: ICD-10-CM

## 2018-08-01 DIAGNOSIS — Z79.899: ICD-10-CM

## 2018-08-01 DIAGNOSIS — K58.1: ICD-10-CM

## 2018-08-01 DIAGNOSIS — D64.9: ICD-10-CM

## 2018-08-01 DIAGNOSIS — R65.20: ICD-10-CM

## 2018-08-01 DIAGNOSIS — G92: ICD-10-CM

## 2018-08-01 LAB
ALP SERPL-CCNC: 142 U/L (ref 50–136)
ALT SERPL W/O P-5'-P-CCNC: 19 U/L (ref 16–63)
APPEARANCE UR: (no result)
AST SERPL-CCNC: 18 U/L (ref 15–37)
BASOPHILS # BLD AUTO: 0 K/UL (ref 0–8)
BASOPHILS NFR BLD AUTO: 0 % (ref 0–2)
BILIRUB DIRECT SERPL-MCNC: 0.3 MG/DL (ref 0–0.2)
BILIRUB SERPL-MCNC: 0.8 MG/DL (ref 0.2–1)
BILIRUB UR QL STRIP: NEGATIVE
BUN SERPL-MCNC: 15 MG/DL (ref 7–18)
CHLORIDE SERPL-SCNC: 99 MMOL/L (ref 98–107)
CO2 SERPL-SCNC: 26 MMOL/L (ref 21–32)
COLOR UR: YELLOW
CREAT SERPL-MCNC: 1.1 MG/DL (ref 0.6–1.3)
EOSINOPHIL # BLD AUTO: 0 K/UL (ref 0–0.7)
EOSINOPHIL NFR BLD AUTO: 0.1 % (ref 0–7)
GLUCOSE SERPL-MCNC: 97 MG/DL (ref 74–106)
GLUCOSE UR STRIP-MCNC: NEGATIVE MG/DL
HCT VFR BLD AUTO: 32.5 % (ref 36.7–47.1)
HGB BLD-MCNC: 10.6 G/DL (ref 12.5–16.3)
KETONES UR STRIP-MCNC: NEGATIVE MG/DL
LEUKOCYTE ESTERASE UR QL STRIP: (no result)
LYMPHOCYTES # BLD AUTO: 1 K/UL (ref 20–40)
LYMPHOCYTES NFR BLD AUTO: 4.1 % (ref 20.5–51.5)
MCH RBC QN AUTO: 30.1 UUG (ref 23.8–33.4)
MCHC RBC AUTO-ENTMCNC: 33 G/DL (ref 32.5–36.3)
MCV RBC AUTO: 92.4 FL (ref 73–96.2)
MONOCYTES # BLD AUTO: 1.6 K/UL (ref 2–10)
MONOCYTES NFR BLD AUTO: 6.7 % (ref 0–11)
MUCOUS THREADS URNS QL MICRO: (no result) /LPF
NEUTROPHILS # BLD AUTO: 21.4 K/UL (ref 1.8–8.9)
NEUTROPHILS NFR BLD AUTO: 89.1 % (ref 38.5–71.5)
NITRITE UR QL STRIP: NEGATIVE
PH UR STRIP: 6.5 [PH] (ref 5–8)
PLATELET # BLD AUTO: 382 K/UL (ref 152–348)
POTASSIUM SERPL-SCNC: 4 MMOL/L (ref 3.5–5.1)
PROT UR QL STRIP: (no result)
RBC # BLD AUTO: 3.52 MIL/UL (ref 4.06–5.63)
RBC #/AREA URNS HPF: (no result) /HPF (ref 0–3)
SP GR UR STRIP: 1.01 (ref 1–1.03)
UROBILINOGEN UR STRIP-MCNC: 0.2 E.U./DL
WBC # BLD AUTO: 24 K/UL (ref 3.6–10.2)
WBC #/AREA URNS HPF: (no result) /HPF
WBC #/AREA URNS HPF: (no result) /HPF (ref 0–3)
WS STN SPEC: 6.4 G/DL (ref 6.4–8.2)

## 2018-08-01 PROCEDURE — C9113 INJ PANTOPRAZOLE SODIUM, VIA: HCPCS

## 2018-08-01 PROCEDURE — A4663 DIALYSIS BLOOD PRESSURE CUFF: HCPCS

## 2018-08-01 PROCEDURE — C1758 CATHETER, URETERAL: HCPCS

## 2018-08-01 RX ADMIN — SODIUM CHLORIDE SCH MLS/HR: 9 INJECTION, SOLUTION INTRAVENOUS at 21:36

## 2018-08-01 RX ADMIN — SODIUM CHLORIDE SCH MLS/HR: 0.9 INJECTION, SOLUTION INTRAVENOUS at 21:22

## 2018-08-01 RX ADMIN — SULFASALAZINE SCH MG: 500 TABLET ORAL at 21:00

## 2018-08-01 RX ADMIN — MIRTAZAPINE SCH MG: 15 TABLET, FILM COATED ORAL at 21:23

## 2018-08-01 RX ADMIN — DONEPEZIL HYDROCHLORIDE SCH MG: 10 TABLET, FILM COATED ORAL at 21:23

## 2018-08-01 NOTE — NUR
RECEIVED PATIENT FROM ER VIA GURNEY. ADMITTED TO ROOM 211. PATIENT AAOX1-2 LETHARGIC, BUT 
ABLE TO ANSWER QUESTIONS AND FOLLOW COMMANDS. ZOSYN AND VANCO IV 1G GIVEN AT ER. NOTIFIED 
SCOTTY MERRITT OF PT'S ARRIVAL TO UNIT AWAITING ADMISSION ORDERS. ADMISSION ASSESSMENTS TO BE 
DONE. WILL CONTINUE TO MONITOR CLOSELY.

## 2018-08-01 NOTE — NUR
PATIENT IS ASLEEP IN BED, AROUSABLE TO VERBAL STIMULI. VERY LETHARGIC AND WEAK. UNABLE TO 
EXPRESS NEEDS. NO S/S OF PAIN OR ACUTE DISTRESS NOTED ON ASSESSMENT. NO FEVER AT PRESENT. 
SAFETY, ASPIRATION AND COMFORT MEASURES IN PLACE. CONTACT ISOLATION INITIATED AS ORDERED. 
WILL CONTINUE TO MONITOR PATIENT

## 2018-08-02 VITALS — DIASTOLIC BLOOD PRESSURE: 61 MMHG | SYSTOLIC BLOOD PRESSURE: 102 MMHG

## 2018-08-02 VITALS — SYSTOLIC BLOOD PRESSURE: 131 MMHG | DIASTOLIC BLOOD PRESSURE: 78 MMHG

## 2018-08-02 VITALS — SYSTOLIC BLOOD PRESSURE: 127 MMHG | DIASTOLIC BLOOD PRESSURE: 82 MMHG

## 2018-08-02 VITALS — DIASTOLIC BLOOD PRESSURE: 77 MMHG | SYSTOLIC BLOOD PRESSURE: 124 MMHG

## 2018-08-02 VITALS — DIASTOLIC BLOOD PRESSURE: 65 MMHG | SYSTOLIC BLOOD PRESSURE: 119 MMHG

## 2018-08-02 LAB
BASOPHILS # BLD AUTO: 0.1 K/UL (ref 0–8)
BASOPHILS NFR BLD AUTO: 0.7 % (ref 0–2)
BUN SERPL-MCNC: 11 MG/DL (ref 7–18)
CHLORIDE SERPL-SCNC: 103 MMOL/L (ref 98–107)
CHOLEST SERPL-MCNC: 118 MG/DL (ref ?–200)
CO2 SERPL-SCNC: 25 MMOL/L (ref 21–32)
CREAT SERPL-MCNC: 1 MG/DL (ref 0.6–1.3)
EOSINOPHIL # BLD AUTO: 0.3 K/UL (ref 0–0.7)
EOSINOPHIL NFR BLD AUTO: 3.2 % (ref 0–7)
GLUCOSE SERPL-MCNC: 78 MG/DL (ref 74–106)
HCT VFR BLD AUTO: 28.6 % (ref 36.7–47.1)
HDLC SERPL-MCNC: 38 MG/DL (ref 40–60)
HGB BLD-MCNC: 9.6 G/DL (ref 12.5–16.3)
LYMPHOCYTES # BLD AUTO: 1.5 K/UL (ref 20–40)
LYMPHOCYTES NFR BLD AUTO: 13.5 % (ref 20.5–51.5)
MAGNESIUM SERPL-MCNC: 1.6 MG/DL (ref 1.8–2.4)
MCH RBC QN AUTO: 31.2 UUG (ref 23.8–33.4)
MCHC RBC AUTO-ENTMCNC: 34 G/DL (ref 32.5–36.3)
MCV RBC AUTO: 93 FL (ref 73–96.2)
MONOCYTES # BLD AUTO: 1.4 K/UL (ref 2–10)
MONOCYTES NFR BLD AUTO: 12.5 % (ref 0–11)
NEUTROPHILS # BLD AUTO: 7.6 K/UL (ref 1.8–8.9)
NEUTROPHILS NFR BLD AUTO: 70.1 % (ref 38.5–71.5)
PHOSPHATE SERPL-MCNC: 3.9 MG/DL (ref 2.5–4.9)
PLATELET # BLD AUTO: 294 K/UL (ref 152–348)
POTASSIUM SERPL-SCNC: 3.5 MMOL/L (ref 3.5–5.1)
RBC # BLD AUTO: 3.08 MIL/UL (ref 4.06–5.63)
TRIGL SERPL-MCNC: 57 MG/DL (ref 30–150)
TSH SERPL DL<=0.005 MIU/L-ACNC: 1.41 MIU/ML (ref 0.36–3.74)
WBC # BLD AUTO: 10.9 K/UL (ref 3.6–10.2)

## 2018-08-02 RX ADMIN — SULFASALAZINE SCH MG: 500 TABLET ORAL at 16:57

## 2018-08-02 RX ADMIN — Medication SCH TAB: at 08:31

## 2018-08-02 RX ADMIN — SULFASALAZINE SCH MG: 500 TABLET ORAL at 13:32

## 2018-08-02 RX ADMIN — VITAMIN D, TAB 1000IU (100/BT) SCH UNIT: 25 TAB at 08:30

## 2018-08-02 RX ADMIN — SODIUM CHLORIDE SCH MLS/HR: 9 INJECTION, SOLUTION INTRAVENOUS at 10:16

## 2018-08-02 RX ADMIN — SULFASALAZINE SCH MG: 500 TABLET ORAL at 08:30

## 2018-08-02 RX ADMIN — DONEPEZIL HYDROCHLORIDE SCH MG: 10 TABLET, FILM COATED ORAL at 21:01

## 2018-08-02 RX ADMIN — MIRTAZAPINE SCH MG: 15 TABLET, FILM COATED ORAL at 21:01

## 2018-08-02 RX ADMIN — SODIUM CHLORIDE SCH MLS/HR: 0.9 INJECTION, SOLUTION INTRAVENOUS at 13:32

## 2018-08-02 RX ADMIN — SODIUM CHLORIDE SCH MLS/HR: 9 INJECTION, SOLUTION INTRAVENOUS at 21:02

## 2018-08-02 RX ADMIN — SODIUM CHLORIDE SCH MLS/HR: 0.9 INJECTION, SOLUTION INTRAVENOUS at 21:02

## 2018-08-02 RX ADMIN — MEMANTINE HYDROCHLORIDE SCH MG: 10 TABLET ORAL at 08:31

## 2018-08-02 RX ADMIN — MEMANTINE HYDROCHLORIDE SCH MG: 10 TABLET ORAL at 16:57

## 2018-08-02 RX ADMIN — SULFASALAZINE SCH MG: 500 TABLET ORAL at 21:01

## 2018-08-02 RX ADMIN — PANTOPRAZOLE SODIUM SCH MG: 40 TABLET, DELAYED RELEASE ORAL at 16:57

## 2018-08-02 RX ADMIN — SODIUM CHLORIDE SCH MLS/HR: 0.9 INJECTION, SOLUTION INTRAVENOUS at 05:22

## 2018-08-02 RX ADMIN — ANORECTAL OINTMENT PRN GM: 15.7; .44; 24; 20.6 OINTMENT TOPICAL at 08:31

## 2018-08-02 NOTE — NUR
PATIENT SLEPT MOST OF THE SHIFT, MORE ALERT THIS A.M. VSS MORE STABLE, NO FEVER. NO S/S OF 
PAIN OR ACUTE DISTRESS NOTED ON THIS SHIFT. PATIENT IN STABLE HEMODYNAMICS STATUS. SAFETY 
AND COMFORT MEASURES MAINTAINED AT ALL TIMES

## 2018-08-02 NOTE — NUR
AWAKE IN BED, AAOX1 FORGETFUL AND CONFUSED. ON TELE WITH SR. NO S/S OF PAIN OR ACUTE 
DISTRESS ON ASSESSMENT. SAFETY AND COMFORT MEASURES IN PLACE, WILL CONTINUE TO MONITOR 
PATIENT

## 2018-08-03 VITALS — DIASTOLIC BLOOD PRESSURE: 67 MMHG | SYSTOLIC BLOOD PRESSURE: 110 MMHG

## 2018-08-03 VITALS — SYSTOLIC BLOOD PRESSURE: 137 MMHG | DIASTOLIC BLOOD PRESSURE: 93 MMHG

## 2018-08-03 VITALS — DIASTOLIC BLOOD PRESSURE: 80 MMHG | SYSTOLIC BLOOD PRESSURE: 137 MMHG

## 2018-08-03 VITALS — DIASTOLIC BLOOD PRESSURE: 94 MMHG | SYSTOLIC BLOOD PRESSURE: 151 MMHG

## 2018-08-03 VITALS — DIASTOLIC BLOOD PRESSURE: 74 MMHG | SYSTOLIC BLOOD PRESSURE: 116 MMHG

## 2018-08-03 VITALS — SYSTOLIC BLOOD PRESSURE: 127 MMHG | DIASTOLIC BLOOD PRESSURE: 64 MMHG

## 2018-08-03 VITALS — DIASTOLIC BLOOD PRESSURE: 88 MMHG | SYSTOLIC BLOOD PRESSURE: 134 MMHG

## 2018-08-03 LAB
BASOPHILS # BLD AUTO: 0 K/UL (ref 0–8)
BASOPHILS NFR BLD AUTO: 0.3 % (ref 0–2)
BUN SERPL-MCNC: 9 MG/DL (ref 7–18)
CHLORIDE SERPL-SCNC: 108 MMOL/L (ref 98–107)
CO2 SERPL-SCNC: 24 MMOL/L (ref 21–32)
CREAT SERPL-MCNC: 0.8 MG/DL (ref 0.6–1.3)
EOSINOPHIL # BLD AUTO: 0.5 K/UL (ref 0–0.7)
EOSINOPHIL NFR BLD AUTO: 4.3 % (ref 0–7)
GLUCOSE SERPL-MCNC: 82 MG/DL (ref 74–106)
HCT VFR BLD AUTO: 28.6 % (ref 36.7–47.1)
HGB BLD-MCNC: 9.6 G/DL (ref 12.5–16.3)
LYMPHOCYTES # BLD AUTO: 1.9 K/UL (ref 20–40)
LYMPHOCYTES NFR BLD AUTO: 17.8 % (ref 20.5–51.5)
MAGNESIUM SERPL-MCNC: 1.6 MG/DL (ref 1.8–2.4)
MCH RBC QN AUTO: 30.6 UUG (ref 23.8–33.4)
MCHC RBC AUTO-ENTMCNC: 34 G/DL (ref 32.5–36.3)
MCV RBC AUTO: 91.5 FL (ref 73–96.2)
MONOCYTES # BLD AUTO: 1.4 K/UL (ref 2–10)
MONOCYTES NFR BLD AUTO: 13.1 % (ref 0–11)
NEUTROPHILS # BLD AUTO: 6.9 K/UL (ref 1.8–8.9)
NEUTROPHILS NFR BLD AUTO: 64.5 % (ref 38.5–71.5)
PHOSPHATE SERPL-MCNC: 2.5 MG/DL (ref 2.5–4.9)
PLATELET # BLD AUTO: 305 K/UL (ref 152–348)
POTASSIUM SERPL-SCNC: 4.2 MMOL/L (ref 3.5–5.1)
RBC # BLD AUTO: 3.13 MIL/UL (ref 4.06–5.63)
WBC # BLD AUTO: 10.7 K/UL (ref 3.6–10.2)

## 2018-08-03 RX ADMIN — SULFASALAZINE SCH MG: 500 TABLET ORAL at 08:25

## 2018-08-03 RX ADMIN — CEPHALEXIN SCH MG: 500 CAPSULE ORAL at 17:44

## 2018-08-03 RX ADMIN — MEMANTINE HYDROCHLORIDE SCH MG: 10 TABLET ORAL at 17:08

## 2018-08-03 RX ADMIN — SULFASALAZINE SCH MG: 500 TABLET ORAL at 13:19

## 2018-08-03 RX ADMIN — PANTOPRAZOLE SODIUM SCH MG: 40 TABLET, DELAYED RELEASE ORAL at 06:03

## 2018-08-03 RX ADMIN — SODIUM CHLORIDE SCH MLS/HR: 0.9 INJECTION, SOLUTION INTRAVENOUS at 12:56

## 2018-08-03 RX ADMIN — PANTOPRAZOLE SODIUM SCH MG: 40 TABLET, DELAYED RELEASE ORAL at 17:08

## 2018-08-03 RX ADMIN — SULFASALAZINE SCH MG: 500 TABLET ORAL at 17:08

## 2018-08-03 RX ADMIN — SODIUM CHLORIDE SCH MLS/HR: 0.9 INJECTION, SOLUTION INTRAVENOUS at 23:34

## 2018-08-03 RX ADMIN — Medication SCH MG: at 20:01

## 2018-08-03 RX ADMIN — MEMANTINE HYDROCHLORIDE SCH MG: 10 TABLET ORAL at 08:25

## 2018-08-03 RX ADMIN — SODIUM CHLORIDE SCH MLS/HR: 9 INJECTION, SOLUTION INTRAVENOUS at 10:09

## 2018-08-03 RX ADMIN — Medication SCH TAB: at 08:25

## 2018-08-03 RX ADMIN — DONEPEZIL HYDROCHLORIDE SCH MG: 10 TABLET, FILM COATED ORAL at 20:00

## 2018-08-03 RX ADMIN — SULFASALAZINE SCH MG: 500 TABLET ORAL at 20:01

## 2018-08-03 RX ADMIN — SODIUM CHLORIDE SCH MLS/HR: 0.9 INJECTION, SOLUTION INTRAVENOUS at 04:54

## 2018-08-03 RX ADMIN — SODIUM CHLORIDE SCH MLS/HR: 0.9 INJECTION, SOLUTION INTRAVENOUS at 15:11

## 2018-08-03 RX ADMIN — VITAMIN D, TAB 1000IU (100/BT) SCH UNIT: 25 TAB at 08:25

## 2018-08-03 RX ADMIN — MIRTAZAPINE SCH MG: 15 TABLET, FILM COATED ORAL at 20:00

## 2018-08-03 NOTE — NUR
RECEIVED PATIENT IN BED, AWAKE, ALERT AND ORIENTED X2, FORGETFUL. NO ACUTE DISTRESS. DENIES 
CHEST PAIN OR SOB AT THIS TIME. PATIENT ON RA, SATURATING AT 93-94%. IV FLUIDS NS  
CC/HR, IV SITE ON THOM INTACT. WILL CONTINUE TO MONITOR

## 2018-08-03 NOTE — NUR
Received pt lying in bed. AAOX1-2. Reported mild pain on right shoulder but tolerable at 
this time. Offered pain medication but decline. In no acute distress. IV site on LFA intact 
and patent. IVF infusing. Safety measure initiated and call bell within reach.

## 2018-08-03 NOTE — NUR
@1920 - Patient BP was 151/94, HR 84, denies any headache or dizziness. After giving Tylenol 
650mg po for pain on left shoulder patient BP went down to 137/94, HR 86 at this time.

## 2018-08-03 NOTE — NUR
End of shift note:

Patient is alert and oriented x2, forgetful. Denies any chest pain or SOB at this time. 
Denies any flank pain or dysuria. Continues on IV fluids NS at 125cc/hr, able to tolerate 
well. PO Keflex started for UTI, no A/R noted. Turned and repositioned every 2 hours. Kept 
clean and dry. IV site on THOM intact and patent. All needs attended and met. Will endorse 
for continuity of care

## 2018-08-03 NOTE — NUR
PATIENT SLEPT WELL ON THIS SHIFT MORE ALERT AND RESPONSIVE, NO S/S OF PAIN OR ACUTE DISTRESS 
ON THIS SHIFT. NO FEVER ON THIS SHIFT, VSS WNL. SAFETY AND COMFORT MEASURES MAINTAINED AT 
ALL TIMES

## 2018-08-04 VITALS — SYSTOLIC BLOOD PRESSURE: 106 MMHG | DIASTOLIC BLOOD PRESSURE: 80 MMHG

## 2018-08-04 VITALS — SYSTOLIC BLOOD PRESSURE: 139 MMHG | DIASTOLIC BLOOD PRESSURE: 71 MMHG

## 2018-08-04 VITALS — DIASTOLIC BLOOD PRESSURE: 74 MMHG | SYSTOLIC BLOOD PRESSURE: 144 MMHG

## 2018-08-04 VITALS — DIASTOLIC BLOOD PRESSURE: 80 MMHG | SYSTOLIC BLOOD PRESSURE: 142 MMHG

## 2018-08-04 VITALS — DIASTOLIC BLOOD PRESSURE: 92 MMHG | SYSTOLIC BLOOD PRESSURE: 151 MMHG

## 2018-08-04 LAB
BASOPHILS # BLD AUTO: 0.1 K/UL (ref 0–8)
BASOPHILS NFR BLD AUTO: 0.5 % (ref 0–2)
BUN SERPL-MCNC: 8 MG/DL (ref 7–18)
CHLORIDE SERPL-SCNC: 108 MMOL/L (ref 98–107)
CO2 SERPL-SCNC: 23 MMOL/L (ref 21–32)
CREAT SERPL-MCNC: 0.8 MG/DL (ref 0.6–1.3)
EOSINOPHIL # BLD AUTO: 0.8 K/UL (ref 0–0.7)
EOSINOPHIL NFR BLD AUTO: 7.8 % (ref 0–7)
GLUCOSE SERPL-MCNC: 74 MG/DL (ref 74–106)
HCT VFR BLD AUTO: 29.9 % (ref 36.7–47.1)
HGB BLD-MCNC: 10.2 G/DL (ref 12.5–16.3)
LYMPHOCYTES # BLD AUTO: 2.4 K/UL (ref 20–40)
LYMPHOCYTES NFR BLD AUTO: 23.4 % (ref 20.5–51.5)
MAGNESIUM SERPL-MCNC: 1.7 MG/DL (ref 1.8–2.4)
MCH RBC QN AUTO: 31.8 UUG (ref 23.8–33.4)
MCHC RBC AUTO-ENTMCNC: 34 G/DL (ref 32.5–36.3)
MCV RBC AUTO: 93.5 FL (ref 73–96.2)
MONOCYTES # BLD AUTO: 1.4 K/UL (ref 2–10)
MONOCYTES NFR BLD AUTO: 13.9 % (ref 0–11)
NEUTROPHILS # BLD AUTO: 5.6 K/UL (ref 1.8–8.9)
NEUTROPHILS NFR BLD AUTO: 54.4 % (ref 38.5–71.5)
PHOSPHATE SERPL-MCNC: 2.5 MG/DL (ref 2.5–4.9)
PLATELET # BLD AUTO: 334 K/UL (ref 152–348)
POTASSIUM SERPL-SCNC: 4 MMOL/L (ref 3.5–5.1)
RBC # BLD AUTO: 3.2 MIL/UL (ref 4.06–5.63)
WBC # BLD AUTO: 10.4 K/UL (ref 3.6–10.2)

## 2018-08-04 RX ADMIN — CEPHALEXIN SCH MG: 500 CAPSULE ORAL at 06:08

## 2018-08-04 RX ADMIN — PANTOPRAZOLE SODIUM SCH MG: 40 TABLET, DELAYED RELEASE ORAL at 16:32

## 2018-08-04 RX ADMIN — SULFASALAZINE SCH MG: 500 TABLET ORAL at 16:36

## 2018-08-04 RX ADMIN — MIRTAZAPINE SCH MG: 15 TABLET, FILM COATED ORAL at 21:00

## 2018-08-04 RX ADMIN — MEMANTINE HYDROCHLORIDE SCH MG: 10 TABLET ORAL at 08:16

## 2018-08-04 RX ADMIN — DONEPEZIL HYDROCHLORIDE SCH MG: 10 TABLET, FILM COATED ORAL at 21:00

## 2018-08-04 RX ADMIN — CEPHALEXIN SCH MG: 500 CAPSULE ORAL at 17:08

## 2018-08-04 RX ADMIN — SULFASALAZINE SCH MG: 500 TABLET ORAL at 12:40

## 2018-08-04 RX ADMIN — VITAMIN D, TAB 1000IU (100/BT) SCH UNIT: 25 TAB at 08:16

## 2018-08-04 RX ADMIN — MEMANTINE HYDROCHLORIDE SCH MG: 10 TABLET ORAL at 16:32

## 2018-08-04 RX ADMIN — Medication SCH TAB: at 08:16

## 2018-08-04 RX ADMIN — ANORECTAL OINTMENT PRN GM: 15.7; .44; 24; 20.6 OINTMENT TOPICAL at 08:38

## 2018-08-04 RX ADMIN — SODIUM CHLORIDE SCH MLS/HR: 0.9 INJECTION, SOLUTION INTRAVENOUS at 16:54

## 2018-08-04 RX ADMIN — SULFASALAZINE SCH MG: 500 TABLET ORAL at 08:16

## 2018-08-04 RX ADMIN — SULFASALAZINE SCH MG: 500 TABLET ORAL at 21:00

## 2018-08-04 RX ADMIN — Medication SCH MG: at 21:00

## 2018-08-04 RX ADMIN — SODIUM CHLORIDE SCH MLS/HR: 0.9 INJECTION, SOLUTION INTRAVENOUS at 08:17

## 2018-08-04 RX ADMIN — PANTOPRAZOLE SODIUM SCH MG: 40 TABLET, DELAYED RELEASE ORAL at 06:08

## 2018-08-04 RX ADMIN — ANORECTAL OINTMENT SCH APPLIC: 15.7; .44; 24; 20.6 OINTMENT TOPICAL at 21:30

## 2018-08-04 NOTE — NUR
PATIENT SEEN AND EXAMINED BY FLORA MERRITT WITH NEW ORDERS AND NOTED SHE IS AWARE OF MAG OF 1.7 
STATED WILL REEVALUATE

## 2018-08-04 NOTE — NUR
AAOX1-2.Denies any further pain. No SOB. Slept well last night. IV site on LFA remains 
intact and patent. IVF infusing. No adverse reaction noted from PO ABX. Safety measure 
maintained and call bell within reach.

## 2018-08-04 NOTE — NUR
RECEIVED PATIENT IN BED AWAKE ALERT AND ORIENTED AT THIS TIME VERBALISED NEEDS REMAIN ON IVF 
AS ORDERED WITH NO S/S OF INFILTERATION ON SITE.CALL LIGHTS ARE WITHIN EASY REACH MADE 
COMFORTABLE AND WILL CONTINUE TO OBSERVE.

## 2018-08-04 NOTE — NUR
PATIENT IS ALERT TO SELF BUT IS DISORIENTED WANTING TO GO TO THE BATHEROOM BUT PATIENT IS 
UNABLE TO WALK ATTEMPTED WITH A WALKER NOTED THAT PATIENT IS INCONTINENT OF BOWEL LOYDA CARE 
PROVIDED AND PATIENT REASSURED BED ALARM IS IN USE FOR SAFETY

## 2018-08-04 NOTE — NUR
NEW ORDER TO GIVE AN EXTRA MAGNESSIUM RECEIVED AND CARRIED OUT PATIENT IS RESTING IN BED 
WITH NO DISTRESS AT THIS TIME.

## 2018-08-04 NOTE — NUR
Patient very lethargic, Arousable to painful stimuli but doses back to sleep. Unable to give 
scheduled medication. VS as follow: /71, HR 64, RR 16. Temp-98.2. O2 sat at 94% on RA. 
Provided O2 at 2LPM via nc. O2 sat went up to 96%/ BS 93. Continue to monitor patient.

## 2018-08-04 NOTE — NUR
Received pt lying in bed. AAOX1-2, pleasantly confused. Denies any pain or SOB at this time. 
 In no acute distress. IV site on LFA nad right hand intact and patent. IVF infusing. Safety 
measure initiated and call bell within reach.

## 2018-08-05 VITALS — DIASTOLIC BLOOD PRESSURE: 71 MMHG | SYSTOLIC BLOOD PRESSURE: 113 MMHG

## 2018-08-05 VITALS — SYSTOLIC BLOOD PRESSURE: 101 MMHG | DIASTOLIC BLOOD PRESSURE: 73 MMHG

## 2018-08-05 LAB
BASOPHILS # BLD AUTO: 0.1 K/UL (ref 0–8)
BASOPHILS NFR BLD AUTO: 0.6 % (ref 0–2)
BUN SERPL-MCNC: 7 MG/DL (ref 7–18)
CHLORIDE SERPL-SCNC: 107 MMOL/L (ref 98–107)
CO2 SERPL-SCNC: 24 MMOL/L (ref 21–32)
CREAT SERPL-MCNC: 0.7 MG/DL (ref 0.6–1.3)
EOSINOPHIL # BLD AUTO: 0.7 K/UL (ref 0–0.7)
EOSINOPHIL NFR BLD AUTO: 7.3 % (ref 0–7)
GLUCOSE SERPL-MCNC: 77 MG/DL (ref 74–106)
HCT VFR BLD AUTO: 32.1 % (ref 36.7–47.1)
HGB BLD-MCNC: 10.6 G/DL (ref 12.5–16.3)
LYMPHOCYTES # BLD AUTO: 2.3 K/UL (ref 20–40)
LYMPHOCYTES NFR BLD AUTO: 24.6 % (ref 20.5–51.5)
MAGNESIUM SERPL-MCNC: 1.7 MG/DL (ref 1.8–2.4)
MCH RBC QN AUTO: 30.7 UUG (ref 23.8–33.4)
MCHC RBC AUTO-ENTMCNC: 33 G/DL (ref 32.5–36.3)
MCV RBC AUTO: 92.9 FL (ref 73–96.2)
MONOCYTES # BLD AUTO: 0.9 K/UL (ref 2–10)
MONOCYTES NFR BLD AUTO: 9.9 % (ref 0–11)
NEUTROPHILS # BLD AUTO: 5.4 K/UL (ref 1.8–8.9)
NEUTROPHILS NFR BLD AUTO: 57.6 % (ref 38.5–71.5)
PHOSPHATE SERPL-MCNC: 2.6 MG/DL (ref 2.5–4.9)
PLATELET # BLD AUTO: 354 K/UL (ref 152–348)
POTASSIUM SERPL-SCNC: 4 MMOL/L (ref 3.5–5.1)
RBC # BLD AUTO: 3.45 MIL/UL (ref 4.06–5.63)
WBC # BLD AUTO: 9.4 K/UL (ref 3.6–10.2)

## 2018-08-05 RX ADMIN — VITAMIN D, TAB 1000IU (100/BT) SCH UNIT: 25 TAB at 08:36

## 2018-08-05 RX ADMIN — ANORECTAL OINTMENT SCH APPLIC: 15.7; .44; 24; 20.6 OINTMENT TOPICAL at 08:37

## 2018-08-05 RX ADMIN — SODIUM CHLORIDE SCH MLS/HR: 0.9 INJECTION, SOLUTION INTRAVENOUS at 12:17

## 2018-08-05 RX ADMIN — SULFASALAZINE SCH MG: 500 TABLET ORAL at 08:38

## 2018-08-05 RX ADMIN — MEMANTINE HYDROCHLORIDE SCH MG: 10 TABLET ORAL at 08:36

## 2018-08-05 RX ADMIN — SODIUM CHLORIDE SCH MLS/HR: 0.9 INJECTION, SOLUTION INTRAVENOUS at 01:49

## 2018-08-05 RX ADMIN — CEPHALEXIN SCH MG: 500 CAPSULE ORAL at 06:05

## 2018-08-05 RX ADMIN — SULFASALAZINE SCH MG: 500 TABLET ORAL at 12:30

## 2018-08-05 RX ADMIN — PANTOPRAZOLE SODIUM SCH MG: 40 TABLET, DELAYED RELEASE ORAL at 06:05

## 2018-08-05 RX ADMIN — Medication SCH TAB: at 08:37

## 2018-08-05 NOTE — NUR
AWAKE ALERT VERBALLY RESPONDS BUT FORGETFUL VERBALISED SIMPLE NEEDS BUT TOTALLY DEPENDENT 
FOR ALL ADL REPOSITIONED FOR COMFORT.REMAIN ON IVF AS ORDERED WITH NO S/S OF INFILTERATION 
ON SITE MADE COMFORTABLE AND WILL CONTINUE TO OBSERVE PATIENT.

## 2018-08-05 NOTE — NUR
CALLED [facility] AND REPORT GIVEN TO HEIDI FOR CONTINUING CARE PATIENT SHOULD 
CONTINUE ON ORAL ANTIBIOTICS AS ORDERED FOR 5 MORE DAYS STARTING TONITE AND FOLLOW UP WITH 
DR GOODSON IN ONE WEEK FOR REEVALUATION OF THE UTI AND SHE EXPRESSED UNDERSTANDING. 
ALSO PER THE DISCHARGE  SHE LEFT A MESSAGE FOR THE PUBLIC GUARDIAN TO INFORM 
HER THAT PATIENT IS BEING DISCHARGED BACK TO [facility]

## 2018-08-05 NOTE — NUR
NEW ORDERS TO DISCHARGE PATIENT TO [facility] RECEIVED AND CARRIED OUT AWAITING FOR 
/DISCHARGE PLANNER TO ARRANG DISCHARGE.

## 2018-08-05 NOTE — NUR
AAOX1-2.Patient slept the whole night. In no acute distress. VS WNL. O2 at 2LPM via NC in 
place. O2 sat at 94%. IV site on right hand and LFA remains intact and patent. IVF infusing. 
Safety measure maintained and call bell within reach.

## 2018-08-05 NOTE — NUR
PATIENT DISCHARGED TO [facility] PICKED UP BY THE AMBULANCE IN SATISFACTORY 
CONDITION WITH DISCHARGE INSTRUCTIONS PATIENT HAS NO PERSONA BELONGINGS AND REPORT WAS 
ALREADY GIVEN TO THE NURSING HOME FOR CONTINUING CARE.

## 2024-06-14 NOTE — NUR
Patient slept comfortably throughout the night. No c/o pain and discomfort. No acute 
distress. No SOB. BM x2 this AM. Pericare provided. Kept clean and dry. All needs attended 
to promptly. Call light within reach. Will continue to monitor. 

-------------------------------------------------------------------------------

Addendum: 01/21/18 at 0635 by TESS KINGSLEY RN

-------------------------------------------------------------------------------

Neuro check done throughout the night Q4h. [Well Developed] : well developed [Well Nourished] : well nourished [No Acute Distress] : no acute distress [Normal Conjunctiva] : normal conjunctiva [Normal Venous Pressure] : normal venous pressure [No Carotid Bruit] : no carotid bruit [Normal S1, S2] : normal S1, S2 [No Murmur] : no murmur [No Rub] : no rub [No Gallop] : no gallop [Clear Lung Fields] : clear lung fields [Good Air Entry] : good air entry [No Respiratory Distress] : no respiratory distress  [Soft] : abdomen soft [Non Tender] : non-tender [No Masses/organomegaly] : no masses/organomegaly [Normal Bowel Sounds] : normal bowel sounds [Normal Gait] : normal gait [No Edema] : no edema [No Cyanosis] : no cyanosis [No Clubbing] : no clubbing [No Varicosities] : no varicosities [No Rash] : no rash [No Skin Lesions] : no skin lesions [Moves all extremities] : moves all extremities [No Focal Deficits] : no focal deficits [Normal Speech] : normal speech [Alert and Oriented] : alert and oriented [Normal memory] : normal memory